# Patient Record
Sex: FEMALE | Race: BLACK OR AFRICAN AMERICAN | NOT HISPANIC OR LATINO | Employment: FULL TIME | URBAN - NONMETROPOLITAN AREA
[De-identification: names, ages, dates, MRNs, and addresses within clinical notes are randomized per-mention and may not be internally consistent; named-entity substitution may affect disease eponyms.]

---

## 2018-04-19 ENCOUNTER — APPOINTMENT (OUTPATIENT)
Dept: ULTRASOUND IMAGING | Age: 43
End: 2018-04-19
Attending: EMERGENCY MEDICINE

## 2018-04-19 ENCOUNTER — HOSPITAL ENCOUNTER (EMERGENCY)
Age: 43
Discharge: HOME | End: 2018-04-19
Attending: EMERGENCY MEDICINE

## 2018-04-19 VITALS
DIASTOLIC BLOOD PRESSURE: 104 MMHG | RESPIRATION RATE: 18 BRPM | BODY MASS INDEX: 37.21 KG/M2 | TEMPERATURE: 98.9 F | OXYGEN SATURATION: 100 % | HEIGHT: 63 IN | WEIGHT: 210 LBS | HEART RATE: 94 BPM | SYSTOLIC BLOOD PRESSURE: 167 MMHG

## 2018-04-19 DIAGNOSIS — R10.2 PELVIC PAIN IN FEMALE: Primary | ICD-10-CM

## 2018-04-19 DIAGNOSIS — D25.1 INTRAMURAL LEIOMYOMA OF UTERUS: ICD-10-CM

## 2018-04-19 LAB
ANION GAP BLD CALC-SCNC: 15 MMOL/L
APPEARANCE UR: CLEAR
B-HCG UR QL: NEGATIVE
BASOPHILS # BLD AUTO: 0 K/MCL (ref 0–0.3)
BASOPHILS NFR BLD AUTO: 0 %
BUN BLD-MCNC: 4 MG/DL (ref 6–20)
CA-I BLD ISE-SCNC: 1.13 MMOL/L (ref 1.15–1.29)
CHLORIDE BLD-SCNC: 106 MMOL/L (ref 98–107)
CO2 BLD-SCNC: 25 MMOL/L (ref 19–24)
COLOR UR: YELLOW
CREAT BLD-MCNC: 0.5 MG/DL (ref 0.51–0.95)
CREAT BLD-MCNC: >90 MG/DL
DIFFERENTIAL METHOD BLD: NORMAL
EOSINOPHIL # BLD AUTO: 0.1 K/MCL (ref 0.1–0.5)
EOSINOPHIL NFR SPEC: 2 %
ERYTHROCYTE [DISTWIDTH] IN BLOOD: 12.5 % (ref 11–15)
GLUCOSE BLD-MCNC: 100 MG/DL (ref 65–99)
GLUCOSE UR STRIP-MCNC: NEGATIVE MG/DL
HCT VFR BLD CALC: 38 % (ref 36–46.5)
HCT VFR BLD CALC: 40 % (ref 36–46.5)
HGB BLD-MCNC: 12.6 G/DL (ref 12–15.5)
HGB BLD-MCNC: 13.6 G/DL (ref 12–15.5)
HGB UR QL STRIP: ABNORMAL
KETONES UR STRIP-MCNC: NEGATIVE MG/DL
LEUKOCYTE ESTERASE UR QL STRIP: NEGATIVE
LYMPHOCYTES # BLD MANUAL: 2.1 K/MCL (ref 1–4.8)
LYMPHOCYTES NFR BLD MANUAL: 42 %
MCH RBC QN AUTO: 28.9 PG (ref 26–34)
MCHC RBC AUTO-ENTMCNC: 33.2 G/DL (ref 32–36.5)
MCV RBC AUTO: 87.2 FL (ref 78–100)
MONOCYTES # BLD MANUAL: 0.4 K/MCL (ref 0.3–0.9)
MONOCYTES NFR BLD MANUAL: 8 %
NEUTROPHILS # BLD: 2.5 K/MCL (ref 1.8–7.7)
NEUTROPHILS NFR BLD AUTO: 48 %
NITRITE UR QL STRIP: NEGATIVE
PH UR STRIP: 6 UNITS (ref 5–7)
PLATELET # BLD: 285 K/MCL (ref 140–450)
POTASSIUM BLD-SCNC: 3.3 MMOL/L (ref 3.4–5.1)
PROT UR STRIP-MCNC: NEGATIVE MG/DL
RBC # BLD: 4.36 MIL/MCL (ref 4–5.2)
SODIUM BLD-SCNC: 141 MMOL/L (ref 135–145)
SP GR UR STRIP: 1.02 (ref 1–1.03)
WBC # BLD: 5.1 K/MCL (ref 4.2–11)

## 2018-04-19 PROCEDURE — 10002807 HB RX 258: Performed by: EMERGENCY MEDICINE

## 2018-04-19 PROCEDURE — 81025 URINE PREGNANCY TEST: CPT | Performed by: EMERGENCY MEDICINE

## 2018-04-19 PROCEDURE — 93975 VASCULAR STUDY: CPT

## 2018-04-19 PROCEDURE — 96374 THER/PROPH/DIAG INJ IV PUSH: CPT

## 2018-04-19 PROCEDURE — 99284 EMERGENCY DEPT VISIT MOD MDM: CPT

## 2018-04-19 PROCEDURE — 96361 HYDRATE IV INFUSION ADD-ON: CPT

## 2018-04-19 PROCEDURE — 85025 COMPLETE CBC W/AUTO DIFF WBC: CPT

## 2018-04-19 PROCEDURE — 76830 TRANSVAGINAL US NON-OB: CPT | Performed by: RADIOLOGY

## 2018-04-19 PROCEDURE — 99284 EMERGENCY DEPT VISIT MOD MDM: CPT | Performed by: EMERGENCY MEDICINE

## 2018-04-19 PROCEDURE — 76856 US EXAM PELVIC COMPLETE: CPT

## 2018-04-19 PROCEDURE — 36415 COLL VENOUS BLD VENIPUNCTURE: CPT

## 2018-04-19 PROCEDURE — 10002800 HB RX 250 W HCPCS: Performed by: EMERGENCY MEDICINE

## 2018-04-19 PROCEDURE — 96375 TX/PRO/DX INJ NEW DRUG ADDON: CPT

## 2018-04-19 PROCEDURE — 80047 BASIC METABLC PNL IONIZED CA: CPT

## 2018-04-19 PROCEDURE — 81003 URINALYSIS AUTO W/O SCOPE: CPT

## 2018-04-19 PROCEDURE — 76856 US EXAM PELVIC COMPLETE: CPT | Performed by: RADIOLOGY

## 2018-04-19 RX ORDER — ONDANSETRON 2 MG/ML
4 INJECTION INTRAMUSCULAR; INTRAVENOUS ONCE
Status: COMPLETED | OUTPATIENT
Start: 2018-04-19 | End: 2018-04-19

## 2018-04-19 RX ADMIN — ONDANSETRON 4 MG: 2 INJECTION INTRAMUSCULAR; INTRAVENOUS at 02:53

## 2018-04-19 RX ADMIN — SODIUM CHLORIDE, POTASSIUM CHLORIDE, SODIUM LACTATE AND CALCIUM CHLORIDE 1000 ML: 600; 310; 30; 20 INJECTION, SOLUTION INTRAVENOUS at 02:54

## 2018-04-19 RX ADMIN — KETOROLAC TROMETHAMINE 15 MG: 30 INJECTION, SOLUTION INTRAMUSCULAR at 02:54

## 2018-04-19 ASSESSMENT — ENCOUNTER SYMPTOMS
COLOR CHANGE: 0
EYE PAIN: 0
SORE THROAT: 0
SHORTNESS OF BREATH: 0
CONSTIPATION: 0
FEVER: 0
ABDOMINAL PAIN: 1
BACK PAIN: 1
DIZZINESS: 0
VOMITING: 0
COUGH: 0
WEAKNESS: 0
BLOOD IN STOOL: 0
NERVOUS/ANXIOUS: 0
LIGHT-HEADEDNESS: 0
CHILLS: 0
DIARRHEA: 0
NAUSEA: 1
CONFUSION: 0

## 2018-04-19 ASSESSMENT — PAIN SCALES - GENERAL
PAINLEVEL_OUTOF10: 10
PAINLEVEL_OUTOF10: 5

## 2018-07-02 ENCOUNTER — HOSPITAL ENCOUNTER (EMERGENCY)
Age: 43
Discharge: HOME | End: 2018-07-02
Attending: EMERGENCY MEDICINE | Admitting: EMERGENCY MEDICINE
Payer: COMMERCIAL

## 2018-07-02 VITALS
TEMPERATURE: 98.4 F | WEIGHT: 220 LBS | DIASTOLIC BLOOD PRESSURE: 105 MMHG | RESPIRATION RATE: 16 BRPM | HEIGHT: 63 IN | HEART RATE: 73 BPM | BODY MASS INDEX: 38.98 KG/M2 | SYSTOLIC BLOOD PRESSURE: 170 MMHG | OXYGEN SATURATION: 100 %

## 2018-07-02 DIAGNOSIS — N93.9 VAGINAL BLEEDING: ICD-10-CM

## 2018-07-02 DIAGNOSIS — R03.0 ELEVATED BLOOD PRESSURE READING: ICD-10-CM

## 2018-07-02 DIAGNOSIS — Z86.018 HISTORY OF UTERINE FIBROID: ICD-10-CM

## 2018-07-02 DIAGNOSIS — R10.2 PELVIC PAIN: Primary | ICD-10-CM

## 2018-07-02 LAB
ALBUMIN SERPL-MCNC: 3.7 G/DL (ref 3.4–5)
ALBUMIN/GLOB SERPL: 0.9 {RATIO} (ref 0.8–1.7)
ALP SERPL-CCNC: 75 U/L (ref 45–117)
ALT SERPL-CCNC: 20 U/L (ref 13–56)
ANION GAP SERPL CALC-SCNC: 8 MMOL/L (ref 3–18)
APPEARANCE UR: CLEAR
AST SERPL-CCNC: 22 U/L (ref 15–37)
BACTERIA URNS QL MICRO: ABNORMAL /HPF
BASOPHILS # BLD: 0 K/UL (ref 0–0.06)
BASOPHILS NFR BLD: 0 % (ref 0–2)
BILIRUB SERPL-MCNC: 0.3 MG/DL (ref 0.2–1)
BILIRUB UR QL: NEGATIVE
BUN SERPL-MCNC: 6 MG/DL (ref 7–18)
BUN/CREAT SERPL: 8 (ref 12–20)
CALCIUM SERPL-MCNC: 8.3 MG/DL (ref 8.5–10.1)
CHLORIDE SERPL-SCNC: 106 MMOL/L (ref 100–108)
CO2 SERPL-SCNC: 26 MMOL/L (ref 21–32)
COLOR UR: YELLOW
CREAT SERPL-MCNC: 0.75 MG/DL (ref 0.6–1.3)
DIFFERENTIAL METHOD BLD: NORMAL
EOSINOPHIL # BLD: 0.1 K/UL (ref 0–0.4)
EOSINOPHIL NFR BLD: 1 % (ref 0–5)
EPITH CASTS URNS QL MICRO: ABNORMAL /LPF (ref 0–5)
ERYTHROCYTE [DISTWIDTH] IN BLOOD BY AUTOMATED COUNT: 12.1 % (ref 11.6–14.5)
GLOBULIN SER CALC-MCNC: 4 G/DL (ref 2–4)
GLUCOSE SERPL-MCNC: 92 MG/DL (ref 74–99)
GLUCOSE UR STRIP.AUTO-MCNC: NEGATIVE MG/DL
HCG UR QL: NEGATIVE
HCT VFR BLD AUTO: 38.5 % (ref 35–45)
HGB BLD-MCNC: 13.3 G/DL (ref 12–16)
HGB UR QL STRIP: ABNORMAL
KETONES UR QL STRIP.AUTO: NEGATIVE MG/DL
LEUKOCYTE ESTERASE UR QL STRIP.AUTO: NEGATIVE
LYMPHOCYTES # BLD: 1.7 K/UL (ref 0.9–3.6)
LYMPHOCYTES NFR BLD: 34 % (ref 21–52)
MCH RBC QN AUTO: 29.6 PG (ref 24–34)
MCHC RBC AUTO-ENTMCNC: 34.5 G/DL (ref 31–37)
MCV RBC AUTO: 85.6 FL (ref 74–97)
MONOCYTES # BLD: 0.3 K/UL (ref 0.05–1.2)
MONOCYTES NFR BLD: 6 % (ref 3–10)
NEUTS SEG # BLD: 2.9 K/UL (ref 1.8–8)
NEUTS SEG NFR BLD: 59 % (ref 40–73)
NITRITE UR QL STRIP.AUTO: NEGATIVE
PH UR STRIP: 5 [PH] (ref 5–8)
PLATELET # BLD AUTO: 302 K/UL (ref 135–420)
PMV BLD AUTO: 9.6 FL (ref 9.2–11.8)
POTASSIUM SERPL-SCNC: 3.6 MMOL/L (ref 3.5–5.5)
PROT SERPL-MCNC: 7.7 G/DL (ref 6.4–8.2)
PROT UR STRIP-MCNC: ABNORMAL MG/DL
RBC # BLD AUTO: 4.5 M/UL (ref 4.2–5.3)
RBC #/AREA URNS HPF: ABNORMAL /HPF (ref 0–5)
SODIUM SERPL-SCNC: 140 MMOL/L (ref 136–145)
SP GR UR REFRACTOMETRY: >1.03 (ref 1–1.03)
UROBILINOGEN UR QL STRIP.AUTO: 1 EU/DL (ref 0.2–1)
WBC # BLD AUTO: 4.9 K/UL (ref 4.6–13.2)
WBC URNS QL MICRO: ABNORMAL /HPF (ref 0–4)

## 2018-07-02 PROCEDURE — 74011250636 HC RX REV CODE- 250/636: Performed by: EMERGENCY MEDICINE

## 2018-07-02 PROCEDURE — 81025 URINE PREGNANCY TEST: CPT | Performed by: NURSE PRACTITIONER

## 2018-07-02 PROCEDURE — 81001 URINALYSIS AUTO W/SCOPE: CPT | Performed by: NURSE PRACTITIONER

## 2018-07-02 PROCEDURE — 85025 COMPLETE CBC W/AUTO DIFF WBC: CPT | Performed by: NURSE PRACTITIONER

## 2018-07-02 PROCEDURE — 80053 COMPREHEN METABOLIC PANEL: CPT | Performed by: NURSE PRACTITIONER

## 2018-07-02 PROCEDURE — 96374 THER/PROPH/DIAG INJ IV PUSH: CPT

## 2018-07-02 PROCEDURE — 96361 HYDRATE IV INFUSION ADD-ON: CPT

## 2018-07-02 PROCEDURE — 99284 EMERGENCY DEPT VISIT MOD MDM: CPT

## 2018-07-02 RX ORDER — TRAMADOL HYDROCHLORIDE 50 MG/1
50 TABLET ORAL
Qty: 12 TAB | Refills: 0 | Status: SHIPPED | OUTPATIENT
Start: 2018-07-02

## 2018-07-02 RX ORDER — KETOROLAC TROMETHAMINE 30 MG/ML
30 INJECTION, SOLUTION INTRAMUSCULAR; INTRAVENOUS
Status: COMPLETED | OUTPATIENT
Start: 2018-07-02 | End: 2018-07-02

## 2018-07-02 RX ADMIN — SODIUM CHLORIDE 500 ML: 900 INJECTION, SOLUTION INTRAVENOUS at 18:11

## 2018-07-02 RX ADMIN — KETOROLAC TROMETHAMINE 30 MG: 30 INJECTION, SOLUTION INTRAMUSCULAR at 18:11

## 2018-07-02 NOTE — LETTER
NOTIFICATION RETURN TO WORK / SCHOOL 
 
7/3/2018 11:42 AM 
 
Ms. Abraham Mittal [address] [place] [address] To Whom It May Concern: 
 
Abraham Mittal is currently under the care of [facility] EMERGENCY DEPT. She will return to work/school on: 7/4/18 If there are questions or concerns please have the patient contact our office.  
 
 
 
Sincerely, 
 
 
AMBER Hensley

## 2018-07-02 NOTE — ED NOTES
Fully awake and ambulatory in ED at time of disposition. Reports significant relief of pain with Toradol injection.

## 2018-07-02 NOTE — ED TRIAGE NOTES
LLQ pain and cramping, onset two day, worsening today, vaginal bleeding started this morning. No clots.  States last cycle on 6/17/18

## 2018-07-02 NOTE — ED PROVIDER NOTES
EMERGENCY DEPARTMENT HISTORY AND PHYSICAL EXAM    Date: 7/2/2018  Patient Name: Anabella Miller    History of Presenting Illness     Chief Complaint   Patient presents with    Abdominal Pain    Vaginal Bleeding         History Provided By: Patient    Chief Complaint: vaginal bleeding and pelvic pain  Duration: 2 Days  Timing:  Acute  Location: vaginal/pelvic  Quality: Cramping  Severity: Moderate  Modifying Factors: h/o uterine fibroids  Associated Symptoms: lightheaded      Additional History (Context): Anabella Miller is a 37 y.o. female with obesity and uterine fibroids who presents with painful vaginal bleeding since yesterday; used 4 tampons today. Is a  and has had ED visits OOT for uterine fibroids. Has US confirming one fibroid here and has appt next month w/GYN at [facility] for hysterectomy consultation. PCP: Deonna Tucker MD    Current Outpatient Prescriptions   Medication Sig Dispense Refill    traMADol (ULTRAM) 50 mg tablet Take 1 Tab by mouth every six (6) hours as needed for Pain. Max Daily Amount: 200 mg. 12 Tab 0       Past History     Past Medical History:  Past Medical History:   Diagnosis Date    Breast lump     right axilla    Endometriosis     Hypertension     not officially diagnosed, not checked out yet per patient       Past Surgical History:  Past Surgical History:   Procedure Laterality Date    ABDOMEN SURGERY PROC UNLISTED      fiboid surgery    HX GYN      ectopic    HX TUBAL LIGATION         Family History:  No family history on file.     Social History:  Social History   Substance Use Topics    Smoking status: Never Smoker    Smokeless tobacco: Never Used    Alcohol use No       Allergies:  No Known Allergies      Review of Systems   Review of Systems  All Other Systems Negative  Physical Exam     Vitals:    07/02/18 1507   BP: (!) 170/105   Pulse: 73   Resp: 16   Temp: 98.4 °F (36.9 °C)   SpO2: 100%   Weight: 99.8 kg (220 lb)   Height: 5' 3\" (1.6 m)     Physical Exam Diagnostic Study Results     Labs -     Recent Results (from the past 12 hour(s))   URINALYSIS W/ RFLX MICROSCOPIC    Collection Time: 07/02/18  3:45 PM   Result Value Ref Range    Color YELLOW      Appearance CLEAR      Specific gravity >1.030 (H) 1.005 - 1.030    pH (UA) 5.0 5.0 - 8.0      Protein TRACE (A) NEG mg/dL    Glucose NEGATIVE  NEG mg/dL    Ketone NEGATIVE  NEG mg/dL    Bilirubin NEGATIVE  NEG      Blood LARGE (A) NEG      Urobilinogen 1.0 0.2 - 1.0 EU/dL    Nitrites NEGATIVE  NEG      Leukocyte Esterase NEGATIVE  NEG     HCG URINE, QL    Collection Time: 07/02/18  3:45 PM   Result Value Ref Range    HCG urine, QL NEGATIVE  NEG     URINE MICROSCOPIC ONLY    Collection Time: 07/02/18  3:45 PM   Result Value Ref Range    WBC 2 to 3 0 - 4 /hpf    RBC TOO NUMEROUS TO COUNT 0 - 5 /hpf    Epithelial cells FEW 0 - 5 /lpf    Bacteria 1+ (A) NEG /hpf   CBC WITH AUTOMATED DIFF    Collection Time: 07/02/18  3:50 PM   Result Value Ref Range    WBC 4.9 4.6 - 13.2 K/uL    RBC 4.50 4. 20 - 5.30 M/uL    HGB 13.3 12.0 - 16.0 g/dL    HCT 38.5 35.0 - 45.0 %    MCV 85.6 74.0 - 97.0 FL    MCH 29.6 24.0 - 34.0 PG    MCHC 34.5 31.0 - 37.0 g/dL    RDW 12.1 11.6 - 14.5 %    PLATELET 835 381 - 824 K/uL    MPV 9.6 9.2 - 11.8 FL    NEUTROPHILS 59 40 - 73 %    LYMPHOCYTES 34 21 - 52 %    MONOCYTES 6 3 - 10 %    EOSINOPHILS 1 0 - 5 %    BASOPHILS 0 0 - 2 %    ABS. NEUTROPHILS 2.9 1.8 - 8.0 K/UL    ABS. LYMPHOCYTES 1.7 0.9 - 3.6 K/UL    ABS. MONOCYTES 0.3 0.05 - 1.2 K/UL    ABS. EOSINOPHILS 0.1 0.0 - 0.4 K/UL    ABS.  BASOPHILS 0.0 0.0 - 0.06 K/UL    DF AUTOMATED     METABOLIC PANEL, COMPREHENSIVE    Collection Time: 07/02/18  3:50 PM   Result Value Ref Range    Sodium 140 136 - 145 mmol/L    Potassium 3.6 3.5 - 5.5 mmol/L    Chloride 106 100 - 108 mmol/L    CO2 26 21 - 32 mmol/L    Anion gap 8 3.0 - 18 mmol/L    Glucose 92 74 - 99 mg/dL    BUN 6 (L) 7.0 - 18 MG/DL    Creatinine 0.75 0.6 - 1.3 MG/DL    BUN/Creatinine ratio 8 (L) 12 - 20      GFR est AA >60 >60 ml/min/1.73m2    GFR est non-AA >60 >60 ml/min/1.73m2    Calcium 8.3 (L) 8.5 - 10.1 MG/DL    Bilirubin, total 0.3 0.2 - 1.0 MG/DL    ALT (SGPT) 20 13 - 56 U/L    AST (SGOT) 22 15 - 37 U/L    Alk. phosphatase 75 45 - 117 U/L    Protein, total 7.7 6.4 - 8.2 g/dL    Albumin 3.7 3.4 - 5.0 g/dL    Globulin 4.0 2.0 - 4.0 g/dL    A-G Ratio 0.9 0.8 - 1.7         Radiologic Studies -   No orders to display     CT Results  (Last 48 hours)    None        CXR Results  (Last 48 hours)    None            Medical Decision Making   I am the first provider for this patient. I reviewed the vital signs, available nursing notes, past medical history, past surgical history, family history and social history. Vital Signs-Reviewed the patient's vital signs. Records Reviewed: Nursing Notes    Procedures:  Pelvic Exam  Date/Time: 7/2/2018 8:31 PM  Performed by: PA  Procedure duration:  3 minutes. Type of exam performed: speculum and bimanual.    External genitalia appearance: normal.    Vaginal exam:  bleeding. Bleeding: moderate  Cervical exam:  os closed and active bleeding from os. Specimen(s) collected:  none. Bimanual exam:  uterine tenderness. Patient tolerance: Patient tolerated the procedure well with no immediate complications          Provider Notes (Medical Decision Making): uterine fibroid; firm on exam, not generous active bleeding now. Stable H/H and VS.  Hydrate for comfort. Give GYN f/up and pain meds. Discussed w/ED attending forgoing US and agrees with plan for f/up as outpatient. MED RECONCILIATION:  No current facility-administered medications for this encounter. Current Outpatient Prescriptions   Medication Sig    traMADol (ULTRAM) 50 mg tablet Take 1 Tab by mouth every six (6) hours as needed for Pain. Max Daily Amount: 200 mg. Disposition:  home    DISCHARGE NOTE:   5:35 PM    Pt has been reexamined.   Patient has no new complaints, changes, or physical findings. Care plan outlined and precautions discussed. Results of labs were reviewed with the patient. All medications were reviewed with the patient; will d/c home with ultram. All of pt's questions and concerns were addressed. Patient was instructed and agrees to follow up with GYN, as well as to return to the ED upon further deterioration. Patient is ready to go home. Follow-up Information     Follow up With Details Comments [address], [place] Schedule an appointment as soon as possible for a visit in 1 day  [facility] Marrero [facility] EMERGENCY DEPT  If symptoms worsen return immediately [address] [place] [address] 51    Callum Galvan MD Schedule an appointment as soon as possible for a visit in 1 day for your blood pressure follow up [address] [address]  [phone]            Discharge Medication List as of 7/2/2018  5:24 PM      START taking these medications    Details   traMADol (ULTRAM) 50 mg tablet Take 1 Tab by mouth every six (6) hours as needed for Pain. Max Daily Amount: 200 mg., Print, Disp-12 Tab, R-0               Diagnosis     Clinical Impression:   1. Pelvic pain    2. Vaginal bleeding    3. History of uterine fibroid    4.  Elevated blood pressure reading

## 2018-07-02 NOTE — DISCHARGE INSTRUCTIONS
Elevated Blood Pressure: Care Instructions  Your Care Instructions    Blood pressure is a measure of how hard the blood pushes against the walls of your arteries. It's normal for blood pressure to go up and down throughout the day. But if it stays up over time, you have high blood pressure. Two numbers tell you your blood pressure. The first number is the systolic pressure. It shows how hard the blood pushes when your heart is pumping. The second number is the diastolic pressure. It shows how hard the blood pushes between heartbeats, when your heart is relaxed and filling with blood. An ideal blood pressure in adults is less than 120/80 (say \"120 over 80\"). High blood pressure is 140/90 or higher. You have high blood pressure if your top number is 140 or higher or your bottom number is 90 or higher, or both. The main test for high blood pressure is simple, fast, and painless. To diagnose high blood pressure, your doctor will test your blood pressure at different times. After testing your blood pressure, your doctor may ask you to test it again when you are home. If you are diagnosed with high blood pressure, you can work with your doctor to make a long-term plan to manage it. Follow-up care is a key part of your treatment and safety. Be sure to make and go to all appointments, and call your doctor if you are having problems. It's also a good idea to know your test results and keep a list of the medicines you take. How can you care for yourself at home? · Do not smoke. Smoking increases your risk for heart attack and stroke. If you need help quitting, talk to your doctor about stop-smoking programs and medicines. These can increase your chances of quitting for good. · Stay at a healthy weight. · Try to limit how much sodium you eat to less than 2,300 milligrams (mg) a day. Your doctor may ask you to try to eat less than 1,500 mg a day. · Be physically active.  Get at least 30 minutes of exercise on most days of the week. Walking is a good choice. You also may want to do other activities, such as running, swimming, cycling, or playing tennis or team sports. · Avoid or limit alcohol. Talk to your doctor about whether you can drink any alcohol. · Eat plenty of fruits, vegetables, and low-fat dairy products. Eat less saturated and total fats. · Learn how to check your blood pressure at home. When should you call for help? Call your doctor now or seek immediate medical care if:  ? · Your blood pressure is much higher than normal (such as 180/110 or higher). ? · You think high blood pressure is causing symptoms such as:  ¨ Severe headache. ¨ Blurry vision. ? Watch closely for changes in your health, and be sure to contact your doctor if:  ? · You do not get better as expected. Where can you learn more? Go to http://unique-clemencia.info/. Enter Q670 in the search box to learn more about \"Elevated Blood Pressure: Care Instructions. \"  Current as of: September 21, 2016  Content Version: 11.4  © 4511-4671 Healthwise, Incorporated. Care instructions adapted under license by [employer] (which disclaims liability or warranty for this information). If you have questions about a medical condition or this instruction, always ask your healthcare professional. [address] any warranty or liability for your use of this information. I have reviewed discharge instructions with the patient. The patient verbalized understanding.

## 2022-04-04 ENCOUNTER — HOSPITAL ENCOUNTER (OUTPATIENT)
Dept: LAB | Age: 47
Discharge: HOME | End: 2022-04-04
Payer: COMMERCIAL

## 2022-04-04 PROCEDURE — 88175 CYTOPATH C/V AUTO FLUID REDO: CPT

## 2022-04-04 PROCEDURE — 88305 TISSUE EXAM BY PATHOLOGIST: CPT

## 2022-04-04 PROCEDURE — 87624 HPV HI-RISK TYP POOLED RSLT: CPT

## 2022-10-17 ENCOUNTER — HOSPITAL ENCOUNTER (OUTPATIENT)
Dept: LAB | Age: 47
Discharge: HOME | End: 2022-10-17
Payer: COMMERCIAL

## 2022-10-17 LAB
ANION GAP SERPL CALC-SCNC: 5 MMOL/L (ref 3–18)
BUN SERPL-MCNC: 11 MG/DL (ref 7–18)
BUN/CREAT SERPL: 22 (ref 12–20)
CALCIUM SERPL-MCNC: 8.8 MG/DL (ref 8.5–10.1)
CHLORIDE SERPL-SCNC: 106 MMOL/L (ref 100–111)
CO2 SERPL-SCNC: 28 MMOL/L (ref 21–32)
CREAT SERPL-MCNC: 0.51 MG/DL (ref 0.6–1.3)
GLUCOSE SERPL-MCNC: 102 MG/DL (ref 74–99)
POTASSIUM SERPL-SCNC: 4.1 MMOL/L (ref 3.5–5.5)
SODIUM SERPL-SCNC: 139 MMOL/L (ref 136–145)

## 2022-10-17 PROCEDURE — 36415 COLL VENOUS BLD VENIPUNCTURE: CPT

## 2022-10-17 PROCEDURE — 80048 BASIC METABOLIC PNL TOTAL CA: CPT

## 2024-02-19 SDOH — HEALTH STABILITY: PHYSICAL HEALTH: ON AVERAGE, HOW MANY DAYS PER WEEK DO YOU ENGAGE IN MODERATE TO STRENUOUS EXERCISE (LIKE A BRISK WALK)?: 6 DAYS

## 2024-02-19 SDOH — HEALTH STABILITY: PHYSICAL HEALTH: ON AVERAGE, HOW MANY MINUTES DO YOU ENGAGE IN EXERCISE AT THIS LEVEL?: 10 MIN

## 2024-02-28 SDOH — HEALTH STABILITY: PHYSICAL HEALTH: ON AVERAGE, HOW MANY DAYS PER WEEK DO YOU ENGAGE IN MODERATE TO STRENUOUS EXERCISE (LIKE A BRISK WALK)?: 6 DAYS

## 2024-02-28 SDOH — HEALTH STABILITY: PHYSICAL HEALTH: ON AVERAGE, HOW MANY MINUTES DO YOU ENGAGE IN EXERCISE AT THIS LEVEL?: 10 MIN

## 2024-03-01 ENCOUNTER — OFFICE VISIT (OUTPATIENT)
Age: 49
End: 2024-03-01

## 2024-03-01 VITALS — BODY MASS INDEX: 44.12 KG/M2 | HEIGHT: 63 IN | WEIGHT: 249 LBS | TEMPERATURE: 97.8 F

## 2024-03-01 DIAGNOSIS — M25.462 EFFUSION OF LEFT KNEE: ICD-10-CM

## 2024-03-01 DIAGNOSIS — M17.12 ARTHRITIS OF LEFT KNEE: ICD-10-CM

## 2024-03-01 DIAGNOSIS — M25.80: ICD-10-CM

## 2024-03-01 DIAGNOSIS — M23.8X2 KNEE CREPITUS, LEFT: ICD-10-CM

## 2024-03-01 DIAGNOSIS — M25.562 LEFT KNEE PAIN, UNSPECIFIED CHRONICITY: Primary | ICD-10-CM

## 2024-03-01 DIAGNOSIS — M25.662 DECREASED RANGE OF MOTION (ROM) OF LEFT KNEE: ICD-10-CM

## 2024-03-01 DIAGNOSIS — E66.01 OBESITY, MORBID, BMI 40.0-49.9 (HCC): ICD-10-CM

## 2024-03-01 RX ORDER — HYDROCODONE BITARTRATE AND ACETAMINOPHEN 5; 325 MG/1; MG/1
TABLET ORAL
COMMUNITY
Start: 2022-04-27

## 2024-03-01 RX ORDER — MEDROXYPROGESTERONE ACETATE 10 MG/1
TABLET ORAL
COMMUNITY

## 2024-03-01 RX ORDER — LOSARTAN POTASSIUM AND HYDROCHLOROTHIAZIDE 12.5; 5 MG/1; MG/1
1 TABLET ORAL DAILY
COMMUNITY

## 2024-03-01 RX ORDER — DICYCLOMINE HCL 20 MG
TABLET ORAL
COMMUNITY

## 2024-03-01 RX ORDER — TRIAMCINOLONE ACETONIDE 40 MG/ML
80 INJECTION, SUSPENSION INTRA-ARTICULAR; INTRAMUSCULAR ONCE
Status: COMPLETED | OUTPATIENT
Start: 2024-03-01 | End: 2024-03-01

## 2024-03-01 RX ORDER — BUPIVACAINE HYDROCHLORIDE 5 MG/ML
7 INJECTION, SOLUTION PERINEURAL ONCE
Status: COMPLETED | OUTPATIENT
Start: 2024-03-01 | End: 2024-03-01

## 2024-03-01 RX ORDER — IBUPROFEN 800 MG/1
TABLET ORAL
COMMUNITY
Start: 2022-04-27

## 2024-03-01 RX ORDER — FAMOTIDINE 40 MG/1
TABLET, FILM COATED ORAL
COMMUNITY
Start: 2022-04-27

## 2024-03-01 RX ADMIN — BUPIVACAINE HYDROCHLORIDE 35 MG: 5 INJECTION, SOLUTION PERINEURAL at 10:12

## 2024-03-01 RX ADMIN — TRIAMCINOLONE ACETONIDE 80 MG: 40 INJECTION, SUSPENSION INTRA-ARTICULAR; INTRAMUSCULAR at 10:12

## 2024-03-01 NOTE — PROGRESS NOTES
Normal symmetry, bulk and tone of facial and neck musculature. Trachea midline.No discernible thyromegaly or masses. No involuntary movements.   Lymphatic: No preauricular, submandibuar, anterior or posterior cervical lymphadenopathy.   Psychiatric: The patient is awake, alert, and oriented to person, place and time. Behavior is normal. Thought content normal.   Cardiovascular: No clubbing, cyanosis.  No edema bilateral lower extremities.   Pulmonary: No tripoding nor accessory muscle recruitment. Breathing normally, no distress, no audible wheezing.     Distal cap refill intact at 2/2 Arie UE / LE.  Neuro intact Arie UE/LE to noxious stimuli      Ortho Specific exam:  Left knee skin intact no warmth, erythema, edema, or ecchymosis.  1+ effusion noted.    MCL LCL ACL and PCL intact no laxity pain or giveaway    Patella tracks midline with crepitation Quad and patellar tendons intact no palpable deformities or concavities noted.    Active range of motion guarded at bedside while supine 95 degrees -5 degrees with a valgus deformity extension plane.  Crepitus is noted throughout.    Soft tissue impingement noted proximal posterior calf.    Popliteal fullness is noted.    No evidence of DVT or calf tenderness left lower extremity.      IMAGING:  Imaging read by myself and interpreted as follows:  X-ray: [facility] 3/1/2024 space 3 view of the left knee reveals tricompartmental osteoarthritis with near complete collapse of the medial joint space and patella patellofemoral.  Moderate narrowing of the lateral joint space.  No lytic or blastic lesions.  No soft tissue ossifications.  No fracture deformities.    IMPRESSION:      ICD-10-CM    1. Left knee pain, unspecified chronicity  M25.562 [06662] Knee 3V     BUPivacaine (MARCAINE) 0.5 % injection 35 mg     triamcinolone acetonide (KENALOG-40) injection 80 mg     DRAIN/INJECT LARGE JOINT/BURSA      2. Arthritis of left knee  M17.12       3. Joint impingement affecting

## 2024-03-14 ENCOUNTER — OFFICE VISIT (OUTPATIENT)
Age: 49
End: 2024-03-14
Payer: COMMERCIAL

## 2024-03-14 VITALS — HEIGHT: 63 IN | WEIGHT: 246 LBS | BODY MASS INDEX: 43.59 KG/M2

## 2024-03-14 DIAGNOSIS — M25.562 LEFT KNEE PAIN, UNSPECIFIED CHRONICITY: Primary | ICD-10-CM

## 2024-03-14 DIAGNOSIS — M47.26 OSTEOARTHRITIS OF SPINE WITH RADICULOPATHY, LUMBAR REGION: ICD-10-CM

## 2024-03-14 DIAGNOSIS — E66.01 OBESITY, MORBID, BMI 40.0-49.9 (HCC): ICD-10-CM

## 2024-03-14 DIAGNOSIS — M25.69 DECREASED RANGE OF MOTION OF TRUNK AND BACK: ICD-10-CM

## 2024-03-14 DIAGNOSIS — M54.59 MECHANICAL LOW BACK PAIN: ICD-10-CM

## 2024-03-14 DIAGNOSIS — M54.50 LUMBAR PAIN: ICD-10-CM

## 2024-03-14 PROCEDURE — G8428 CUR MEDS NOT DOCUMENT: HCPCS | Performed by: PHYSICIAN ASSISTANT

## 2024-03-14 PROCEDURE — 1036F TOBACCO NON-USER: CPT | Performed by: PHYSICIAN ASSISTANT

## 2024-03-14 PROCEDURE — 72100 X-RAY EXAM L-S SPINE 2/3 VWS: CPT | Performed by: PHYSICIAN ASSISTANT

## 2024-03-14 PROCEDURE — G8484 FLU IMMUNIZE NO ADMIN: HCPCS | Performed by: PHYSICIAN ASSISTANT

## 2024-03-14 PROCEDURE — G8417 CALC BMI ABV UP PARAM F/U: HCPCS | Performed by: PHYSICIAN ASSISTANT

## 2024-03-14 PROCEDURE — 3017F COLORECTAL CA SCREEN DOC REV: CPT | Performed by: PHYSICIAN ASSISTANT

## 2024-03-14 PROCEDURE — 99213 OFFICE O/P EST LOW 20 MIN: CPT | Performed by: PHYSICIAN ASSISTANT

## 2024-03-14 RX ORDER — CYCLOBENZAPRINE HCL 10 MG
10 TABLET ORAL 3 TIMES DAILY PRN
Qty: 30 TABLET | Refills: 0 | Status: SHIPPED | OUTPATIENT
Start: 2024-03-14 | End: 2024-03-24

## 2024-03-14 NOTE — PROGRESS NOTES
decrease carbohydrate and sugar intake.  Patient recommended a formal dietary consult which they will consider and return a call to our office.  In light of the patient's osteoarthritic findings I am making a recommendation for aerobic exercise to include but not limited to stationary bicycle, elliptical, therapeutic walking with good shoes and or swimming.  Patient should avoid any running or jumping.  If using the treadmill then recommendation for no elevation and no running or jogging.       Appropriate for outpatient management. Will discuss supportive treatment, NSAIDS, RICE and orthopedic follow-up. Discussed treatment plan, return precautions, symptomatic relief, and expected time to improvement. All questions answered. Patient is stable for discharge and outpatient management. Medication use, risk/benefit, side effects, and precautions discussed.        Care plan outlined and precautions discussed.  Results were reviewed with the patient. All medications were reviewed with the patient. All of pt's questions and concerns were addressed.  Alarm symptoms and return precautions associated with chief complaint and evaluation were reviewed with the patient in detail.  The patient demonstrated adequate understanding.The patient expresses willing compliance with the treatment plan.     Special note: Medication management discussed in detail all patient's questions answered to their satisfaction.        Patient provided a reminder for a \"due or due soon\" health maintenance. I have asked the patient to schedule an appointment with their primary care provider for follow-up on general health maintenance concerns.    Today all the patient's questions were answered to their satisfaction.  Copies of x-rays reviewed if obtained this visit, and provided to patient.        Dictation disclaimer:  Please note that this dictation was completed with \"Dragon\", the computer voice recognition software. Today's exam was dictated 
using fluency dictation software (voice recognition software). Occasionally, sound-a-like computer induced   dictation errors will populate the report.  Quite often unanticipated grammatical, syntax, homophones, and other interpretive errors are inadvertently transcribed by the computer software.  Please disregard these errors.  Please excuse any errors that have escaped final proofreading.        Nghia NELSON, APC, MPAS, PA-C  [facility]  [facility]

## 2024-04-03 ENCOUNTER — TRANSCRIBE ORDERS (OUTPATIENT)
Facility: HOSPITAL | Age: 49
End: 2024-04-03

## 2024-04-03 DIAGNOSIS — M54.59 MECHANICAL LOW BACK PAIN: ICD-10-CM

## 2024-04-03 DIAGNOSIS — M54.50 LUMBAR PAIN: ICD-10-CM

## 2024-04-03 DIAGNOSIS — M47.26 OSTEOARTHRITIS OF SPINE WITH RADICULOPATHY, LUMBAR REGION: Primary | ICD-10-CM

## 2024-04-03 DIAGNOSIS — M25.69 DECREASED RANGE OF MOTION OF TRUNK AND BACK: ICD-10-CM

## 2024-04-12 ENCOUNTER — OFFICE VISIT (OUTPATIENT)
Age: 49
End: 2024-04-12

## 2024-04-12 VITALS — HEIGHT: 63 IN | BODY MASS INDEX: 43.41 KG/M2 | WEIGHT: 245 LBS

## 2024-04-12 DIAGNOSIS — M65.311 TRIGGER THUMB OF RIGHT HAND: Primary | ICD-10-CM

## 2024-04-12 RX ORDER — LIDOCAINE HYDROCHLORIDE 10 MG/ML
0.5 INJECTION, SOLUTION INFILTRATION; PERINEURAL ONCE
Status: COMPLETED | OUTPATIENT
Start: 2024-04-12 | End: 2024-04-12

## 2024-04-12 RX ADMIN — LIDOCAINE HYDROCHLORIDE 0.5 ML: 10 INJECTION, SOLUTION INFILTRATION; PERINEURAL at 11:37

## 2024-04-12 NOTE — PROGRESS NOTES
Esme Gonzales is a 48 y.o. female right handed .  Worker's Compensation and legal considerations: none    Chief Complaint   Patient presents with    Hand Pain     Thumb rt      Pain Score:   5    HPI: Patient presents today with complaints of right thumb pain and locking.  She localizes the pain to near the MCP joint.    Date of onset: Indeterminate  Injury: No  Prior Treatment:  No    ROS: Review of Systems - General ROS: negative except HPI    Past Medical History:   Diagnosis Date    Breast lump     right axilla    Endometriosis     Hypertension     not officially diagnosed, not checked out yet per patient       Past Surgical History:   Procedure Laterality Date    GYN      ectopic    [place] UNLISTED PROCEDURE ABDOMEN PERITONEUM & OMENTUM      fiboid surgery    TUBAL LIGATION          Current Outpatient Medications   Medication Sig Dispense Refill    diclofenac (VOLTAREN) 50 MG EC tablet Take 1 tablet by mouth 3 times daily (with meals) 60 tablet 3    dicyclomine (BENTYL) 20 MG tablet TAKE 1 TAB BY MOUTH 4 TIMES DAILY AS NEEDED (ABDOMINAL PAIN). PRN      famotidine (PEPCID) 40 MG tablet Take 1 tablet every day by oral route at bedtime.      ibuprofen (ADVIL;MOTRIN) 800 MG tablet Take 1 tablet 3 times a day by oral route as needed.      losartan-hydroCHLOROthiazide (HYZAAR) 50-12.5 MG per tablet Take 1 tablet by mouth daily      HYDROcodone-acetaminophen (NORCO) 5-325 MG per tablet Take 1 tablet every 8 hours by oral route as needed. (Patient not taking: Reported on 4/12/2024)      medroxyPROGESTERone (PROVERA) 10 MG tablet Take 1 tablet every day by oral route for 7 days. (Patient not taking: Reported on 4/12/2024)      traMADol (ULTRAM) 50 MG tablet Take 1 tablet by mouth every 6 hours as needed. (Patient not taking: Reported on 4/12/2024)       No current facility-administered medications for this visit.       Allergies   Allergen Reactions    Other          Ht 1.6 m (5' 3\")   Wt 111.1 kg (245 lb)

## 2024-04-18 ENCOUNTER — OFFICE VISIT (OUTPATIENT)
Age: 49
End: 2024-04-18
Payer: MEDICAID

## 2024-04-18 VITALS — WEIGHT: 246 LBS | BODY MASS INDEX: 43.59 KG/M2 | HEIGHT: 63 IN

## 2024-04-18 DIAGNOSIS — M17.12 ARTHRITIS OF LEFT KNEE: Primary | ICD-10-CM

## 2024-04-18 PROCEDURE — 99213 OFFICE O/P EST LOW 20 MIN: CPT | Performed by: PHYSICIAN ASSISTANT

## 2024-04-18 PROCEDURE — 20610 DRAIN/INJ JOINT/BURSA W/O US: CPT | Performed by: PHYSICIAN ASSISTANT

## 2024-04-18 NOTE — PROGRESS NOTES
Patient: Esme Gonzales                MRN: [id number]       SSN: [id number]  YOB: 1975        AGE: 48 y.o.        SEX: female      OFFICE NOTE DICTATED    PCP: Unknown, Provider, APRN - NP  04/18/24 4/18/2024: Patient returns the office for follow-up regarding her left knee.  She does have tricompartmental osteoarthritis of her left knee and today is here to begin Gelsyn viscosupplementation.        Chief Complaint   Patient presents with    Knee Pain     Left         HISTORY:    Esme Gonzales is a 48 y.o. female presents to the office with complaint of left knee pain.  Pain has been present for 6 months.  Patient is a long- with [employer].  She is off now for the next 3 months.  She notes pain when she stands and walks in the left knee without any buckling or giveaway sensation.  She also has pain in the right hand with a noted trigger thumb.  She also is today complaining of back pain.    She is actively participating in a self-guided fasting and weight loss program.    No history of trauma associated with the left knee.    Failed to redirect to the Timeline version of the [employer] SmartLink.    No results found for: \"HBA1C\", \"[id number]\"      4/18/2024     1:17 PM 4/12/2024    11:14 AM 3/14/2024     1:23 PM 3/1/2024     9:14 AM   Weight Metrics   Weight 246 lb 245 lb 246 lb 249 lb   BMI (Calculated) 43.7 kg/m2 43.5 kg/m2 43.7 kg/m2 44.2 kg/m2          Problem List Items Addressed This Visit    None  Visit Diagnoses       Arthritis of left knee    -  Primary    Relevant Medications    sodium hyaluronate (Viscosup) injection SOSY 16.8 mg (Start on 4/18/2024  1:45 PM)    Other Relevant Orders    DRAIN/INJECT LARGE JOINT/BURSA              PAST MEDICAL HISTORY:       Diagnosis Date    Breast lump     right axilla    Endometriosis     Hypertension     not officially diagnosed, not checked out yet per patient        PAST SURGICAL HISTORY:       Procedure Laterality Date

## 2024-04-25 ENCOUNTER — OFFICE VISIT (OUTPATIENT)
Age: 49
End: 2024-04-25
Payer: MEDICAID

## 2024-04-25 VITALS — WEIGHT: 246 LBS | HEIGHT: 63 IN | BODY MASS INDEX: 43.59 KG/M2

## 2024-04-25 DIAGNOSIS — M17.12 ARTHRITIS OF LEFT KNEE: Primary | ICD-10-CM

## 2024-04-25 PROCEDURE — 20610 DRAIN/INJ JOINT/BURSA W/O US: CPT | Performed by: PHYSICIAN ASSISTANT

## 2024-04-25 PROCEDURE — 90000 NO LOS: CPT | Performed by: PHYSICIAN ASSISTANT

## 2024-04-25 NOTE — PROGRESS NOTES
Patient: Esme Gonzales                MRN: [id number]       SSN: [id number]  YOB: 1975        AGE: 48 y.o.        SEX: female      OFFICE NOTE DICTATED    PCP: Unknown, Provider, APRN - NP  04/25/24 4/25/2024: Patient returns for continuation of Gelsyn for her left knee.    4/18/2024: Patient returns the office for follow-up regarding her left knee.  She does have tricompartmental osteoarthritis of her left knee and today is here to begin Gelsyn viscosupplementation.        Chief Complaint   Patient presents with    Knee Pain     Left         HISTORY:    Esme Gonzales is a 48 y.o. female presents to the office with complaint of left knee pain.  Pain has been present for 6 months.  Patient is a long- with [employer].  She is off now for the next 3 months.  She notes pain when she stands and walks in the left knee without any buckling or giveaway sensation.  She also has pain in the right hand with a noted trigger thumb.  She also is today complaining of back pain.    She is actively participating in a self-guided fasting and weight loss program.    No history of trauma associated with the left knee.    Failed to redirect to the Timeline version of the [employer] SmartLink.    No results found for: \"HBA1C\", \"[id number]\"      4/25/2024     1:31 PM 4/18/2024     1:17 PM 4/12/2024    11:14 AM 3/14/2024     1:23 PM 3/1/2024     9:14 AM   Weight Metrics   Weight 246 lb 246 lb 245 lb 246 lb 249 lb   BMI (Calculated) 43.7 kg/m2 43.7 kg/m2 43.5 kg/m2 43.7 kg/m2 44.2 kg/m2          Problem List Items Addressed This Visit    None  Visit Diagnoses       Arthritis of left knee    -  Primary    Relevant Medications    sodium hyaluronate (Viscosup) injection SOSY 16.8 mg (Start on 4/25/2024  2:00 PM)    Other Relevant Orders    DRAIN/INJECT LARGE JOINT/BURSA              PAST MEDICAL HISTORY:       Diagnosis Date    Breast lump     right axilla    Endometriosis     Hypertension     not

## 2024-05-02 ENCOUNTER — OFFICE VISIT (OUTPATIENT)
Age: 49
End: 2024-05-02
Payer: MEDICAID

## 2024-05-02 VITALS — HEIGHT: 63 IN | BODY MASS INDEX: 44.12 KG/M2 | WEIGHT: 249 LBS

## 2024-05-02 DIAGNOSIS — M17.12 ARTHRITIS OF LEFT KNEE: Primary | ICD-10-CM

## 2024-05-02 PROCEDURE — 20610 DRAIN/INJ JOINT/BURSA W/O US: CPT | Performed by: PHYSICIAN ASSISTANT

## 2024-05-02 PROCEDURE — 90000 NO LOS: CPT | Performed by: PHYSICIAN ASSISTANT

## 2024-05-02 NOTE — PROGRESS NOTES
patient in detail.  The patient demonstrated adequate understanding.The patient expresses willing compliance with the treatment plan.     Special note: Medication management discussed in detail all patient's questions answered to their satisfaction.        Patient provided a reminder for a \"due or due soon\" health maintenance. I have asked the patient to schedule an appointment with their primary care provider for follow-up on general health maintenance concerns.    Today all the patient's questions were answered to their satisfaction.  Copies of x-rays reviewed if obtained this visit, and provided to patient.        Dictation disclaimer:  Please note that this dictation was completed with \"Dragon\", the computer voice recognition software. Today's exam was dictated using fluency dictation software (voice recognition software). Occasionally, sound-a-like computer induced   dictation errors will populate the report.  Quite often unanticipated grammatical, syntax, homophones, and other interpretive errors are inadvertently transcribed by the computer software.  Please disregard these errors.  Please excuse any errors that have escaped final proofreading.        Nghia NELSON, APC, MPAS, PA-C  [facility]  [facility]

## 2024-05-08 ENCOUNTER — TELEPHONE (OUTPATIENT)
Age: 49
End: 2024-05-08

## 2024-05-08 NOTE — TELEPHONE ENCOUNTER
[facility] called and is requesting to have the patient MRI order re enter in the system.

## 2024-05-30 ENCOUNTER — HOSPITAL ENCOUNTER (OUTPATIENT)
Facility: HOSPITAL | Age: 49
Discharge: HOME | End: 2024-05-30
Payer: MEDICAID

## 2024-05-30 DIAGNOSIS — M54.59 MECHANICAL LOW BACK PAIN: ICD-10-CM

## 2024-05-30 DIAGNOSIS — M25.69 DECREASED RANGE OF MOTION OF TRUNK AND BACK: ICD-10-CM

## 2024-05-30 DIAGNOSIS — M54.50 LUMBAR PAIN: ICD-10-CM

## 2024-05-30 DIAGNOSIS — M47.26 OSTEOARTHRITIS OF SPINE WITH RADICULOPATHY, LUMBAR REGION: ICD-10-CM

## 2024-05-30 PROCEDURE — 72148 MRI LUMBAR SPINE W/O DYE: CPT

## 2024-06-06 ENCOUNTER — TELEPHONE (OUTPATIENT)
Age: 49
End: 2024-06-06

## 2024-06-06 NOTE — TELEPHONE ENCOUNTER
Patient is asking if tomorrow's appointment to review MRI can be changed to virtual instead.  Please advise patient at [phone].

## 2024-06-07 DIAGNOSIS — M54.50 LUMBAR PAIN: Primary | ICD-10-CM

## 2025-01-10 ENCOUNTER — OFFICE VISIT (OUTPATIENT)
Age: 50
End: 2025-01-10

## 2025-01-10 VITALS — HEIGHT: 64 IN | BODY MASS INDEX: 43.36 KG/M2 | TEMPERATURE: 97.3 F | WEIGHT: 254 LBS

## 2025-01-10 DIAGNOSIS — M23.8X1 KNEE CREPITUS, RIGHT: ICD-10-CM

## 2025-01-10 DIAGNOSIS — M25.461 EFFUSION, RIGHT KNEE: ICD-10-CM

## 2025-01-10 DIAGNOSIS — M25.561 RIGHT KNEE PAIN, UNSPECIFIED CHRONICITY: Primary | ICD-10-CM

## 2025-01-10 DIAGNOSIS — M25.661 DECREASED RANGE OF MOTION (ROM) OF RIGHT KNEE: ICD-10-CM

## 2025-01-10 DIAGNOSIS — M17.11 ARTHRITIS OF KNEE, RIGHT: ICD-10-CM

## 2025-01-10 RX ORDER — BUPIVACAINE HYDROCHLORIDE 5 MG/ML
17 INJECTION, SOLUTION PERINEURAL ONCE
Status: COMPLETED | OUTPATIENT
Start: 2025-01-10 | End: 2025-01-10

## 2025-01-10 RX ORDER — TRIAMCINOLONE ACETONIDE 40 MG/ML
80 INJECTION, SUSPENSION INTRA-ARTICULAR; INTRAMUSCULAR ONCE
Status: COMPLETED | OUTPATIENT
Start: 2025-01-10 | End: 2025-01-10

## 2025-01-10 RX ADMIN — BUPIVACAINE HYDROCHLORIDE 85 MG: 5 INJECTION, SOLUTION PERINEURAL at 10:36

## 2025-01-10 RX ADMIN — TRIAMCINOLONE ACETONIDE 80 MG: 40 INJECTION, SUSPENSION INTRA-ARTICULAR; INTRAMUSCULAR at 10:37

## 2025-01-10 NOTE — PROGRESS NOTES
Patient: Esme Gonzales                MRN: [id number]       SSN: [id number]  YOB: 1975        AGE: 49 y.o.        SEX: female      OFFICE NOTE DICTATED    PCP: Indiana Lynn APRN - NP  01/10/25    January 10, 2025: Patient returns to the office with complaint of bilateral knee pain acute on chronic today right greater than left.  She was treated previously as recent as May for symptoms in the left knee.  She would like treatment for the right knee today.    2024: Patient returns the office for follow-up regarding completion of Gelsyn-3 for her left knee.  She persists with left knee pain and has underlying tricompartmental osteoarthritis of.  She also was ordered an MRI of her back but that has not been performed today in the imaging order has .  A new order for MRI was placed.    2024: Patient returns for continuation of Gelsyn for her left knee.    2024: Patient returns the office for follow-up regarding her left knee.  She does have tricompartmental osteoarthritis of her left knee and today is here to begin Gelsyn viscosupplementation.        Chief Complaint   Patient presents with    Knee Pain     Right         HISTORY:    Esme Gonzales is a 49 y.o. female presents to the office with complaint of left knee pain.  Pain has been present for 6 months.  Patient is a long- with [employer].  She is off now for the next 3 months.  She notes pain when she stands and walks in the left knee without any buckling or giveaway sensation.  She also has pain in the right hand with a noted trigger thumb.  She also is today complaining of back pain.    She is actively participating in a self-guided fasting and weight loss program.    No history of trauma associated with the left knee.        No results found for: \"HBA1C\", \"[id number]\"      1/10/2025     8:44 AM 2024    10:25 AM 2024     1:31 PM 2024     1:17 PM 2024    11:14 AM

## 2025-01-17 ENCOUNTER — OFFICE VISIT (OUTPATIENT)
Age: 50
End: 2025-01-17
Payer: MEDICAID

## 2025-01-17 VITALS — HEIGHT: 64 IN | TEMPERATURE: 97.5 F | BODY MASS INDEX: 43.71 KG/M2 | WEIGHT: 256 LBS

## 2025-01-17 DIAGNOSIS — M17.12 ARTHRITIS OF LEFT KNEE: Primary | ICD-10-CM

## 2025-01-17 DIAGNOSIS — M17.11 ARTHRITIS OF KNEE, RIGHT: ICD-10-CM

## 2025-01-17 DIAGNOSIS — M25.462 EFFUSION OF LEFT KNEE: ICD-10-CM

## 2025-01-17 PROCEDURE — 99214 OFFICE O/P EST MOD 30 MIN: CPT | Performed by: PHYSICIAN ASSISTANT

## 2025-01-17 PROCEDURE — 20611 DRAIN/INJ JOINT/BURSA W/US: CPT | Performed by: PHYSICIAN ASSISTANT

## 2025-01-17 RX ORDER — BUPIVACAINE HYDROCHLORIDE 5 MG/ML
17 INJECTION, SOLUTION PERINEURAL ONCE
Status: COMPLETED | OUTPATIENT
Start: 2025-01-17 | End: 2025-01-17

## 2025-01-17 RX ORDER — TRIAMCINOLONE ACETONIDE 40 MG/ML
80 INJECTION, SUSPENSION INTRA-ARTICULAR; INTRAMUSCULAR ONCE
Status: COMPLETED | OUTPATIENT
Start: 2025-01-17 | End: 2025-01-17

## 2025-01-17 RX ADMIN — BUPIVACAINE HYDROCHLORIDE 85 MG: 5 INJECTION, SOLUTION PERINEURAL at 10:43

## 2025-01-17 RX ADMIN — TRIAMCINOLONE ACETONIDE 80 MG: 40 INJECTION, SUSPENSION INTRA-ARTICULAR; INTRAMUSCULAR at 10:44

## 2025-01-17 NOTE — PROGRESS NOTES
follow-up. Discussed treatment plan, return precautions, symptomatic relief, and expected time to improvement. All questions answered. Patient is stable for discharge and outpatient management. Medication use, risk/benefit, side effects, and precautions discussed.        Care plan outlined and precautions discussed.  Results were reviewed with the patient. All medications were reviewed with the patient. All of pt's questions and concerns were addressed.  Alarm symptoms and return precautions associated with chief complaint and evaluation were reviewed with the patient in detail.  The patient demonstrated adequate understanding.The patient expresses willing compliance with the treatment plan.     Special note: Medication management discussed in detail all patient's questions answered to their satisfaction.        Patient provided a reminder for a \"due or due soon\" health maintenance. I have asked the patient to schedule an appointment with their primary care provider for follow-up on general health maintenance concerns.    Today all the patient's questions were answered to their satisfaction.  Copies of x-rays reviewed if obtained this visit, and provided to patient.        Dictation disclaimer:  Please note that this dictation was completed with \"Dragon\", the computer voice recognition software. Today's exam was dictated using fluency dictation software (voice recognition software). Occasionally, sound-a-like computer induced   dictation errors will populate the report.  Quite often unanticipated grammatical, syntax, homophones, and other interpretive errors are inadvertently transcribed by the computer software.  Please disregard these errors.  Please excuse any errors that have escaped final proofreading.        Nghia NELSON, APC, MPAS, PA-C  [facility]  [facility]

## 2025-03-04 ENCOUNTER — TELEPHONE (OUTPATIENT)
Age: 50
End: 2025-03-04

## 2025-03-04 NOTE — TELEPHONE ENCOUNTER
Spoke with Ms. Gonzales about her CPAP use in relation to her upcoming appointment on 3-7-2025. The patient reported that she uses her CPAP unaware of the DME company. Informed the patient to bring her SD card for compliance data. Also patient stated that she would like to reschedule her appointment due to starting a new job.

## 2025-03-10 ENCOUNTER — TELEPHONE (OUTPATIENT)
Age: 50
End: 2025-03-10

## 2025-03-10 NOTE — TELEPHONE ENCOUNTER
Patient wants to know if [facility] will prescribe loop diuretics to help with the swelling in both of her knees until her 3/17/2025 appt for Euflexxa injections    Patient tel [phone]      [facility] [id number] - [place], [place] - [address] [facility] [phone] - F [phone]

## 2025-03-31 RX ORDER — ROSUVASTATIN CALCIUM 20 MG/1
20 TABLET, COATED ORAL DAILY
COMMUNITY
Start: 2025-03-09

## 2025-03-31 RX ORDER — LIRAGLUTIDE 6 MG/ML
1.2 INJECTION, SOLUTION SUBCUTANEOUS
COMMUNITY
Start: 2025-01-15

## 2025-03-31 RX ORDER — ATORVASTATIN CALCIUM 10 MG/1
10 TABLET, FILM COATED ORAL DAILY
COMMUNITY
Start: 2025-01-08

## 2025-03-31 RX ORDER — AMLODIPINE BESYLATE 5 MG/1
5 TABLET ORAL DAILY
COMMUNITY
Start: 2025-02-01

## 2025-04-03 NOTE — PROGRESS NOTES
[facility]   Sleep Medicine     Office Progress Note - Initial Evaluation        [address]  [address]  [facility] [address] [phone] Fax    Reason for visit/referral: Evaluation for possible obstructive sleep apnea/sleep disordered breathing  Assessment:      1. JEANNE (obstructive sleep apnea)  -     PAT - Home Sleep Test; Future  2. Snoring  3. Nocturia  4. Essential hypertension  Assessment & Plan:   Chronic, not at goal (unstable), continue current treatment plan, needs to take BP meds when she gets home. Asymptomatic. Managed by PCP  5. Morbid obesity with BMI of 40.0-44.9, adult  Assessment & Plan:   Is trying to lose weight with medications, managed by PCP  6. Bruxism, sleep-related       Esme Gonzales is a 49-year-old female with history of hypertension and obesity who was referred for a reevaluation for obstructive sleep apnea.    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  We do not have access to those results.  At first she was told she did not have obstructive sleep apnea and then was called back and told that she did.  Is unclear what those results did actually demonstrate.  She was given a CPAP machine which she has used faithfully ever since even though it actually disrupts her sleep and she does not care for her feel if she gets benefit from it.    Her machine stopped working and/or if she ran out of supplies this past February.  In looking at her machine today, it looks fairly worn and the filter is being held in by tape.    It also seems that no one was monitoring her data in terms of a sleep professional and it is unclear if her machine was set correctly or her mask fit well etc.    We discussed options and I think getting an updated home sleep apnea test is necessary and would be needed anyway to establish her with a local DME company.  I suggest we get her updated home study and then

## 2025-04-03 NOTE — PATIENT INSTRUCTIONS
Please make a follow up appointment to discuss the results of your sleep study or I will send you a \"my chart\" message with your results and treatment options.     The [facility] is located in the [facility], adjacent to [facility]. The lab is on the second floor. The direct number to call for sleep study related questions is: [phone].    Please call our clinic back at [phone] or send a message on [employer] if you have any questions or concerns or if you are experiencing any of the following:     You have not received a follow up appointment within 30 days prior the recommended follow up time.    If you are not tolerating treatment plan and/or not able to obtain equipment or prescribed medication(s).  if you are experiencing any difficulties with the Durable Medical Equipment  (DME) Company you may be using or is assigned to you.  Two weeks have passed and you have not received an appointment for a scheduled procedure.  Two weeks have passed since you underwent a test and/or procedure and you have not received your results.  Your  Durable Medical Equipment (DME ) company is supposed to provide you with replacement filters, tubing and masks. You can either call your DME when you need new supplies or you can arrange for an automatic shipment schedule.    Your need to be seen by our office at lat minimum of every 12 months in order to renew the prescription for these supplies.   Please make note of who your DME company is and their phone number.   Please make sure that you clean your mask and hosing on a regular basis.  Your DME can provide you with additional information regarding proper care and cleaning of your device     Safety is strongly encouraged.  You should not drive if sleepy, tired, distracted and/or fatigued.      Chest Xrays and blood work do not require appointments.  They are considered \"Walk-In\" services and can be obtained at either [facility] or [facility]

## 2025-04-04 ENCOUNTER — OFFICE VISIT (OUTPATIENT)
Age: 50
End: 2025-04-04
Payer: MEDICAID

## 2025-04-04 VITALS
HEIGHT: 64 IN | HEART RATE: 113 BPM | WEIGHT: 247.4 LBS | DIASTOLIC BLOOD PRESSURE: 104 MMHG | OXYGEN SATURATION: 95 % | RESPIRATION RATE: 18 BRPM | BODY MASS INDEX: 42.24 KG/M2 | TEMPERATURE: 97.8 F | SYSTOLIC BLOOD PRESSURE: 172 MMHG

## 2025-04-04 DIAGNOSIS — E66.01 MORBID OBESITY WITH BMI OF 40.0-44.9, ADULT: ICD-10-CM

## 2025-04-04 DIAGNOSIS — G47.63 BRUXISM, SLEEP-RELATED: ICD-10-CM

## 2025-04-04 DIAGNOSIS — G47.33 OSA (OBSTRUCTIVE SLEEP APNEA): Primary | ICD-10-CM

## 2025-04-04 DIAGNOSIS — R35.1 NOCTURIA: ICD-10-CM

## 2025-04-04 DIAGNOSIS — I10 ESSENTIAL HYPERTENSION: ICD-10-CM

## 2025-04-04 DIAGNOSIS — R06.83 SNORING: ICD-10-CM

## 2025-04-04 PROBLEM — G47.19 EXCESSIVE DAYTIME SLEEPINESS: Status: ACTIVE | Noted: 2025-04-04

## 2025-04-04 PROCEDURE — 99204 OFFICE O/P NEW MOD 45 MIN: CPT | Performed by: OTOLARYNGOLOGY

## 2025-04-04 PROCEDURE — 3077F SYST BP >= 140 MM HG: CPT | Performed by: OTOLARYNGOLOGY

## 2025-04-04 PROCEDURE — 3080F DIAST BP >= 90 MM HG: CPT | Performed by: OTOLARYNGOLOGY

## 2025-04-04 ASSESSMENT — SLEEP AND FATIGUE QUESTIONNAIRES
HOW LIKELY ARE YOU TO NOD OFF OR FALL ASLEEP WHILE WATCHING TV: WOULD NEVER DOZE
HOW LIKELY ARE YOU TO NOD OFF OR FALL ASLEEP WHILE SITTING QUIETLY AFTER LUNCH WITHOUT ALCOHOL: WOULD NEVER DOZE
HOW LIKELY ARE YOU TO NOD OFF OR FALL ASLEEP WHILE SITTING AND READING: WOULD NEVER DOZE
HOW LIKELY ARE YOU TO NOD OFF OR FALL ASLEEP WHILE WATCHING TV: WOULD NEVER DOZE
HOW LIKELY ARE YOU TO NOD OFF OR FALL ASLEEP WHILE SITTING AND TALKING TO SOMEONE: WOULD NEVER DOZE
ESS TOTAL SCORE: 0
HOW LIKELY ARE YOU TO NOD OFF OR FALL ASLEEP WHILE LYING DOWN TO REST IN THE AFTERNOON WHEN CIRCUMSTANCES PERMIT: WOULD NEVER DOZE
HOW LIKELY ARE YOU TO NOD OFF OR FALL ASLEEP IN A CAR, WHILE STOPPED FOR A FEW MINUTES IN TRAFFIC: WOULD NEVER DOZE
HOW LIKELY ARE YOU TO NOD OFF OR FALL ASLEEP WHILE SITTING INACTIVE IN A PUBLIC PLACE: WOULD NEVER DOZE
HOW LIKELY ARE YOU TO NOD OFF OR FALL ASLEEP WHEN YOU ARE A PASSENGER IN A CAR FOR AN HOUR WITHOUT A BREAK: WOULD NEVER DOZE
HOW LIKELY ARE YOU TO NOD OFF OR FALL ASLEEP WHILE SITTING QUIETLY AFTER LUNCH WITHOUT ALCOHOL: WOULD NEVER DOZE
HOW LIKELY ARE YOU TO NOD OFF OR FALL ASLEEP WHILE SITTING AND TALKING TO SOMEONE: WOULD NEVER DOZE
HOW LIKELY ARE YOU TO NOD OFF OR FALL ASLEEP IN A CAR, WHILE STOPPED FOR A FEW MINUTES IN TRAFFIC: WOULD NEVER DOZE
HOW LIKELY ARE YOU TO NOD OFF OR FALL ASLEEP WHILE SITTING INACTIVE IN A PUBLIC PLACE: WOULD NEVER DOZE
HOW LIKELY ARE YOU TO NOD OFF OR FALL ASLEEP WHILE LYING DOWN TO REST IN THE AFTERNOON WHEN CIRCUMSTANCES PERMIT: WOULD NEVER DOZE
HOW LIKELY ARE YOU TO NOD OFF OR FALL ASLEEP WHILE SITTING AND READING: WOULD NEVER DOZE
HOW LIKELY ARE YOU TO NOD OFF OR FALL ASLEEP WHEN YOU ARE A PASSENGER IN A CAR FOR AN HOUR WITHOUT A BREAK: WOULD NEVER DOZE

## 2025-04-04 NOTE — PROGRESS NOTES
Esme Gonzales presents today for   Chief Complaint   Patient presents with    Sleep Apnea       Is someone accompanying this pt? no    Is the patient using any DME equipment during OV? cpap    -DME Company unknown    Depression Screening:         No data to display                Learning Assessment:    Failed to redirect to the Timeline version of the [employer].    Abuse Screening:         No data to display                Fall Risk    Failed to redirect to the Timeline version of the [employer] SmartLink.    Coordination of Care:    1. Have you been to the ER, urgent care clinic since your last visit? Hospitalized since your last visit? no    2. Have you seen or consulted any other health care providers outside of the [facility] since your last visit? Include any pap smears or colon screening. no    Medication list has been update per patient.

## 2025-04-04 NOTE — ASSESSMENT & PLAN NOTE
Chronic, not at goal (unstable), continue current treatment plan, needs to take BP meds when she gets home. Asymptomatic. Managed by PCP

## 2025-05-08 ENCOUNTER — OFFICE VISIT (OUTPATIENT)
Facility: CLINIC | Age: 50
End: 2025-05-08
Payer: MEDICAID

## 2025-05-08 VITALS
DIASTOLIC BLOOD PRESSURE: 95 MMHG | WEIGHT: 252 LBS | HEART RATE: 94 BPM | RESPIRATION RATE: 11 BRPM | SYSTOLIC BLOOD PRESSURE: 151 MMHG | OXYGEN SATURATION: 98 % | HEIGHT: 64 IN | BODY MASS INDEX: 43.02 KG/M2 | TEMPERATURE: 97.3 F

## 2025-05-08 DIAGNOSIS — Z12.4 CERVICAL CANCER SCREENING: ICD-10-CM

## 2025-05-08 DIAGNOSIS — Z76.89 ENCOUNTER TO ESTABLISH CARE WITH NEW DOCTOR: Primary | ICD-10-CM

## 2025-05-08 DIAGNOSIS — Z13.6 ENCOUNTER FOR SCREENING FOR CORONARY ARTERY DISEASE: ICD-10-CM

## 2025-05-08 DIAGNOSIS — D21.9 FIBROIDS: ICD-10-CM

## 2025-05-08 DIAGNOSIS — Z13.31 DEPRESSION SCREEN: ICD-10-CM

## 2025-05-08 DIAGNOSIS — R42 VERTIGO: ICD-10-CM

## 2025-05-08 DIAGNOSIS — Z12.31 SCREENING MAMMOGRAM FOR BREAST CANCER: ICD-10-CM

## 2025-05-08 DIAGNOSIS — Z13.220 SCREENING CHOLESTEROL LEVEL: ICD-10-CM

## 2025-05-08 DIAGNOSIS — E66.01 MORBID OBESITY WITH BMI OF 40.0-44.9, ADULT (HCC): ICD-10-CM

## 2025-05-08 DIAGNOSIS — Z78.0 MENOPAUSE: ICD-10-CM

## 2025-05-08 DIAGNOSIS — Z13.1 ENCOUNTER FOR SCREENING FOR DIABETES MELLITUS: ICD-10-CM

## 2025-05-08 DIAGNOSIS — I10 ESSENTIAL HYPERTENSION: ICD-10-CM

## 2025-05-08 DIAGNOSIS — M17.10 ARTHRITIS OF KNEE: ICD-10-CM

## 2025-05-08 PROCEDURE — G2211 COMPLEX E/M VISIT ADD ON: HCPCS | Performed by: STUDENT IN AN ORGANIZED HEALTH CARE EDUCATION/TRAINING PROGRAM

## 2025-05-08 PROCEDURE — 99203 OFFICE O/P NEW LOW 30 MIN: CPT | Performed by: STUDENT IN AN ORGANIZED HEALTH CARE EDUCATION/TRAINING PROGRAM

## 2025-05-08 PROCEDURE — 3077F SYST BP >= 140 MM HG: CPT | Performed by: STUDENT IN AN ORGANIZED HEALTH CARE EDUCATION/TRAINING PROGRAM

## 2025-05-08 PROCEDURE — 3080F DIAST BP >= 90 MM HG: CPT | Performed by: STUDENT IN AN ORGANIZED HEALTH CARE EDUCATION/TRAINING PROGRAM

## 2025-05-08 PROCEDURE — 96127 BRIEF EMOTIONAL/BEHAV ASSMT: CPT | Performed by: STUDENT IN AN ORGANIZED HEALTH CARE EDUCATION/TRAINING PROGRAM

## 2025-05-08 SDOH — ECONOMIC STABILITY: FOOD INSECURITY: WITHIN THE PAST 12 MONTHS, YOU WORRIED THAT YOUR FOOD WOULD RUN OUT BEFORE YOU GOT MONEY TO BUY MORE.: NEVER TRUE

## 2025-05-08 SDOH — ECONOMIC STABILITY: FOOD INSECURITY: WITHIN THE PAST 12 MONTHS, THE FOOD YOU BOUGHT JUST DIDN'T LAST AND YOU DIDN'T HAVE MONEY TO GET MORE.: NEVER TRUE

## 2025-05-08 SDOH — HEALTH STABILITY: PHYSICAL HEALTH: ON AVERAGE, HOW MANY DAYS PER WEEK DO YOU ENGAGE IN MODERATE TO STRENUOUS EXERCISE (LIKE A BRISK WALK)?: 1 DAY

## 2025-05-08 SDOH — HEALTH STABILITY: PHYSICAL HEALTH: ON AVERAGE, HOW MANY MINUTES DO YOU ENGAGE IN EXERCISE AT THIS LEVEL?: 10 MIN

## 2025-05-08 ASSESSMENT — PATIENT HEALTH QUESTIONNAIRE - PHQ9
SUM OF ALL RESPONSES TO PHQ QUESTIONS 1-9: 0
SUM OF ALL RESPONSES TO PHQ QUESTIONS 1-9: 0
2. FEELING DOWN, DEPRESSED OR HOPELESS: NOT AT ALL
SUM OF ALL RESPONSES TO PHQ QUESTIONS 1-9: 0
SUM OF ALL RESPONSES TO PHQ QUESTIONS 1-9: 0
1. LITTLE INTEREST OR PLEASURE IN DOING THINGS: NOT AT ALL

## 2025-05-08 NOTE — PATIENT INSTRUCTIONS
Please ask [facility] to send us your colon cancer screening lab results.  [address]  [address]  [place] [address]  P: [phone]  F: [phone]      It was nice meeting you today.  Please have your labs done either immediately after this visit, or you can schedule at the  to come back for labs.  Please do your labs at least a week before your next appointment.    If you have questions or concerns, My Chart is the fastest way to get in touch with me and the clinical staff.    Keep in mind that [facility] policy schedules most appointments to last 15 minutes. If you have a new problem or multiple problems you can request 30 minutes.  If you arrive more than [facility] through your scheduled appointment, staff may offer to reschedule you. This is to ensure you and the provider have enough time to complete a high quality encounter.  Please arrive to your appointments at least 10 minutes early to avoid any unforseen delays.     If you have trouble paying medical bills, please consider contacting [employer].  [employer] patient representatives are available to discuss government programs that provide assistance with medical bills to qualifying individuals and their families.  The services are provided free of charge to patients in need of assistance.  [employer] patient representatives can also assist you in completing and registering applications with appropriate agencies.  Please call the following number if you are interested: [phone].    Dear Esme Gonzales,      I hope this message finds you well. We wanted to inform you that our practice has made an attempt to reach out to you regarding your referral for your left knee pain. Unfortunately, it seems we were unable to connect.     Please feel free to contact us at your earliest convenience, and we will be happy to assist you with scheduling your appointment.     Thank you for your attention, and we look forward to hearing from you soon.

## 2025-05-08 NOTE — PROGRESS NOTES
Esme Gonzales is a 50 y.o. year old female who presents today for   Chief Complaint   Patient presents with    New Patient        \"Have you been to the ER, urgent care clinic since your last visit?  Hospitalized since your last visit?\"   NO      “Have you seen or consulted any other health care providers outside our system since your last visit?”   NO     Have you had a mammogram?”   NO    No breast cancer screening on file       “Have you had a colorectal cancer screening such as a colonoscopy/FIT/Cologuard?    NO    No colonoscopy on file  No cologuard on file  No FIT/FOBT on file   No flexible sigmoidoscopy on file           James Brown [facility]  [facility]  Ph: [phone]  Fax: [phone]

## 2025-05-08 NOTE — PROGRESS NOTES
History of Present Illness  Esme Gonzales is a 50 y.o. female who presents today for management of    Chief Complaint   Patient presents with    New Patient     CC: new patient here for healthcare maintenance    -Patient is here to establish care.   -Previous PCP: [facility]   -Employment: , but cannot pass DOT physical right now  -She rents a room     Medical History:  Fibroids  - was referred to gyn by previous PCP; no phone call yet    Menopause  - very severe    OA  - back and bilateral knees  - worse with sitting for too long  - ibuprofen 800 BID not very helpful  - was seeing [facility], but wants to see a different ortho clinic due to cancelled appointments    HLD  - crestor 20    HTN  - losartan-hctz 50-12.5  - stopped taking amlodipine due to SE (palpitations)    Dizziness  - one episode while laying down and turning    Social  - EtOH: rare  - Tobacco: no  - Illicit drugs: no    Healthcare maintenance, patient reported:  Vaccine records requested from previous PCP/pharmacy if available  Last Colonoscopy: FIT done last week with [facility]   Last Pap: unknown  Last Mammo: needed  Birth control: no       Social Drivers of Health     Tobacco Use: Low Risk  (5/8/2025)    Patient History     Smoking Tobacco Use: Never     Smokeless Tobacco Use: Never     Passive Exposure: Not on file   Alcohol Use: Not on file   Financial Resource Strain: Not on file   Food Insecurity: No Food Insecurity (5/8/2025)    Hunger Vital Sign     Worried About Running Out of Food in the Last Year: Never true     Ran Out of Food in the Last Year: Never true   Transportation Needs: No Transportation Needs (5/8/2025)    PRAPARE - Transportation     Lack of Transportation (Medical): No     Lack of Transportation (Non-Medical): No   Physical Activity: Insufficiently Active (5/8/2025)    Exercise Vital Sign     Days of Exercise per Week: 1 day     Minutes of Exercise per Session: 10 min

## 2025-05-09 ENCOUNTER — HOSPITAL ENCOUNTER (OUTPATIENT)
Facility: HOSPITAL | Age: 50
Setting detail: SPECIMEN
Discharge: HOME | End: 2025-05-12
Payer: MEDICAID

## 2025-05-09 DIAGNOSIS — E66.01 MORBID OBESITY WITH BMI OF 40.0-44.9, ADULT (HCC): ICD-10-CM

## 2025-05-09 DIAGNOSIS — Z13.220 SCREENING CHOLESTEROL LEVEL: ICD-10-CM

## 2025-05-09 DIAGNOSIS — Z13.1 ENCOUNTER FOR SCREENING FOR DIABETES MELLITUS: ICD-10-CM

## 2025-05-09 DIAGNOSIS — Z13.6 ENCOUNTER FOR SCREENING FOR CORONARY ARTERY DISEASE: ICD-10-CM

## 2025-05-09 DIAGNOSIS — I10 ESSENTIAL HYPERTENSION: ICD-10-CM

## 2025-05-09 DIAGNOSIS — Z76.89 ENCOUNTER TO ESTABLISH CARE WITH NEW DOCTOR: ICD-10-CM

## 2025-05-09 LAB
ALBUMIN SERPL-MCNC: 3.8 G/DL (ref 3.4–5)
ALBUMIN/GLOB SERPL: 1 (ref 0.8–1.7)
ALP SERPL-CCNC: 97 U/L (ref 45–117)
ALT SERPL-CCNC: 19 U/L (ref 10–35)
ANION GAP SERPL CALC-SCNC: 12 MMOL/L (ref 3–18)
AST SERPL-CCNC: 25 U/L (ref 10–38)
BILIRUB SERPL-MCNC: <0.2 MG/DL (ref 0.2–1)
BUN SERPL-MCNC: 12 MG/DL (ref 6–23)
BUN/CREAT SERPL: 17 (ref 12–20)
CALCIUM SERPL-MCNC: 9.7 MG/DL (ref 8.5–10.1)
CHLORIDE SERPL-SCNC: 101 MMOL/L (ref 98–107)
CHOLEST SERPL-MCNC: 206 MG/DL
CO2 SERPL-SCNC: 26 MMOL/L (ref 21–32)
CREAT SERPL-MCNC: 0.69 MG/DL (ref 0.6–1.3)
ERYTHROCYTE [DISTWIDTH] IN BLOOD BY AUTOMATED COUNT: 12.6 % (ref 11.6–14.5)
EST. AVERAGE GLUCOSE BLD GHB EST-MCNC: 137 MG/DL
GLOBULIN SER CALC-MCNC: 3.7 G/DL (ref 2–4)
GLUCOSE SERPL-MCNC: 87 MG/DL (ref 74–108)
HBA1C MFR BLD: 6.4 % (ref 4.2–5.6)
HCT VFR BLD AUTO: 41.8 % (ref 35–45)
HDLC SERPL-MCNC: 53 MG/DL (ref 40–60)
HDLC SERPL: 3.9 (ref 0–5)
HGB BLD-MCNC: 13 G/DL (ref 12–16)
LDLC SERPL CALC-MCNC: 144 MG/DL (ref 0–100)
MCH RBC QN AUTO: 27.8 PG (ref 24–34)
MCHC RBC AUTO-ENTMCNC: 31.1 G/DL (ref 31–37)
MCV RBC AUTO: 89.5 FL (ref 78–100)
NRBC # BLD: 0 K/UL (ref 0–0.01)
NRBC BLD-RTO: 0 PER 100 WBC
PLATELET # BLD AUTO: 392 K/UL (ref 135–420)
PMV BLD AUTO: 10.2 FL (ref 9.2–11.8)
POTASSIUM SERPL-SCNC: 4 MMOL/L (ref 3.5–5.5)
PROT SERPL-MCNC: 7.5 G/DL (ref 6.4–8.2)
RBC # BLD AUTO: 4.67 M/UL (ref 4.2–5.3)
SODIUM SERPL-SCNC: 139 MMOL/L (ref 136–145)
TRIGL SERPL-MCNC: 48 MG/DL (ref 0–150)
VLDLC SERPL CALC-MCNC: 10 MG/DL
WBC # BLD AUTO: 4.4 K/UL (ref 4.6–13.2)

## 2025-05-09 PROCEDURE — 80053 COMPREHEN METABOLIC PANEL: CPT

## 2025-05-09 PROCEDURE — 36415 COLL VENOUS BLD VENIPUNCTURE: CPT

## 2025-05-09 PROCEDURE — 85027 COMPLETE CBC AUTOMATED: CPT

## 2025-05-09 PROCEDURE — 83036 HEMOGLOBIN GLYCOSYLATED A1C: CPT

## 2025-05-09 PROCEDURE — 80061 LIPID PANEL: CPT

## 2025-05-19 ENCOUNTER — HOSPITAL ENCOUNTER (OUTPATIENT)
Dept: SLEEP MEDICINE | Facility: HOSPITAL | Age: 50
Discharge: HOME | End: 2025-05-22
Attending: OTOLARYNGOLOGY
Payer: MEDICAID

## 2025-05-19 DIAGNOSIS — G47.33 OSA (OBSTRUCTIVE SLEEP APNEA): ICD-10-CM

## 2025-05-19 PROCEDURE — 95800 SLP STDY UNATTENDED: CPT

## 2025-05-20 PROBLEM — G47.33 OSA (OBSTRUCTIVE SLEEP APNEA): Status: ACTIVE | Noted: 2025-05-20

## 2025-05-22 ENCOUNTER — OFFICE VISIT (OUTPATIENT)
Facility: CLINIC | Age: 50
End: 2025-05-22
Payer: MEDICAID

## 2025-05-22 VITALS
DIASTOLIC BLOOD PRESSURE: 82 MMHG | SYSTOLIC BLOOD PRESSURE: 139 MMHG | OXYGEN SATURATION: 97 % | WEIGHT: 253.2 LBS | HEART RATE: 98 BPM | RESPIRATION RATE: 13 BRPM | HEIGHT: 64 IN | BODY MASS INDEX: 43.23 KG/M2 | TEMPERATURE: 97.2 F

## 2025-05-22 DIAGNOSIS — I10 ESSENTIAL HYPERTENSION: ICD-10-CM

## 2025-05-22 DIAGNOSIS — E11.9 TYPE 2 DIABETES MELLITUS WITHOUT COMPLICATION, WITHOUT LONG-TERM CURRENT USE OF INSULIN (HCC): Primary | ICD-10-CM

## 2025-05-22 DIAGNOSIS — E78.00 HYPERCHOLESTEREMIA: ICD-10-CM

## 2025-05-22 PROCEDURE — 99214 OFFICE O/P EST MOD 30 MIN: CPT | Performed by: STUDENT IN AN ORGANIZED HEALTH CARE EDUCATION/TRAINING PROGRAM

## 2025-05-22 PROCEDURE — G2211 COMPLEX E/M VISIT ADD ON: HCPCS | Performed by: STUDENT IN AN ORGANIZED HEALTH CARE EDUCATION/TRAINING PROGRAM

## 2025-05-22 PROCEDURE — 3044F HG A1C LEVEL LT 7.0%: CPT | Performed by: STUDENT IN AN ORGANIZED HEALTH CARE EDUCATION/TRAINING PROGRAM

## 2025-05-22 PROCEDURE — 3075F SYST BP GE 130 - 139MM HG: CPT | Performed by: STUDENT IN AN ORGANIZED HEALTH CARE EDUCATION/TRAINING PROGRAM

## 2025-05-22 PROCEDURE — 3079F DIAST BP 80-89 MM HG: CPT | Performed by: STUDENT IN AN ORGANIZED HEALTH CARE EDUCATION/TRAINING PROGRAM

## 2025-05-22 RX ORDER — CANDESARTAN CILEXETIL AND HYDROCHLOROTHIAZIDE 16; 12.5 MG/1; MG/1
2 TABLET ORAL DAILY
Qty: 60 TABLET | Refills: 0 | Status: SHIPPED | OUTPATIENT
Start: 2025-05-22

## 2025-05-22 NOTE — PROGRESS NOTES
Esme Gonzales (:  1975) is a 50 y.o. female here for evaluation of the following chief complaint(s):  Discuss Labs, Hypertension, and Obesity        ASSESSMENT/PLAN:  1. Type 2 diabetes mellitus without complication, without long-term current use of insulin (HCC)  Assessment & Plan:  - A1C 6.4  - starts metformin  - A1C in 3 months   Orders:  -     metFORMIN (GLUCOPHAGE) 500 MG tablet; Take 1 tablet by mouth daily (with breakfast), Disp-90 tablet, R-1Normal  -     Hemoglobin A1C; Future  2. Essential hypertension  Assessment & Plan:  - controlled  - poorly tolerated losatan, switch to candesartan-hctz  - check BP in 1 month   Orders:  -     candesartan-hydroCHLOROthiazide (ATACAND HCT) 16-12.5 MG per tablet; Take 2 tablets by mouth daily, Disp-60 tablet, R-0Normal  3. Hypercholesteremia  Assessment & Plan:  -   - pt wants to cont rosuvastatin 20  - chek lipids in 6 months    Orders:  -     Lipid Panel; Future      Follow-up and Dispositions    Return in about 3 months (around 2025) for Diabetes.         SUBJECTIVE/OBJECTIVE:  Hypertension      T2DM  - A1C 6.4  - accepts metformin    Fibroids  - was referred to gyn by previous PCP; no phone call yet  Update (25)  - no call from gyn; pt to call     Menopause  - very severe  Update (25)  - could not get fezolinetant, it was denied by insurance; she is not sure why     OA  - back and bilateral knees  - worse with sitting for too long  - ibuprofen 800 BID not very helpful  - was seeing [facility], but wants to see a different ortho clinic due to cancelled appointments  Update (25)  - no call from ortho; pt to call     HLD  - crestor 20  Update (25)  -       HTN  - losartan-hctz 50-12.5  - stopped taking amlodipine due to SE (palpitations)  Update (25)  - pt corrected the dose to 100-25; it is causing drowsiness     Dizziness  - one episode while laying down and turning  Update (25)  - epley maneuvers

## 2025-05-22 NOTE — PROGRESS NOTES
Esme Gonzales is a 50 y.o. year old female who presents today for   Chief Complaint   Patient presents with    Discuss Labs    Hypertension    Obesity        \"Have you been to the ER, urgent care clinic since your last visit?  Hospitalized since your last visit?\"   NO      “Have you seen or consulted any other health care providers outside our system since your last visit?”   NO       “Have you had a colorectal cancer screening such as a colonoscopy/FIT/Cologuard?    NO    No colonoscopy on file  No cologuard on file  No FIT/FOBT on file   No flexible sigmoidoscopy on file           James Brown CMA  [facility]  Ph: [phone]  Fax: [phone]

## 2025-05-22 NOTE — PROGRESS NOTES
Esme Gonzales (:  1975) is a 50 y.o. female here for evaluation of the following chief complaint(s):  Discuss Labs, Hypertension, and Obesity        ASSESSMENT/PLAN:  1. Type 2 diabetes mellitus without complication, without long-term current use of insulin (HCC)  -     metFORMIN (GLUCOPHAGE) 500 MG tablet; Take 1 tablet by mouth daily (with breakfast), Disp-90 tablet, R-1Normal  -     Hemoglobin A1C; Future  2. Essential hypertension  -     candesartan-hydroCHLOROthiazide (ATACAND HCT) 16-12.5 MG per tablet; Take 2 tablets by mouth daily, Disp-60 tablet, R-0Normal  3. Hypercholesteremia  Assessment & Plan:  -   - pt wants to cont rosuvastatin 20  - chek lipids in 6 months    Orders:  -     Lipid Panel; Future      Follow-up and Dispositions    Return in about 3 months (around 2025) for Diabetes.         SUBJECTIVE/OBJECTIVE:  HPI  T2DM  - A1C 6.4  - accepts metformin    Fibroids  - was referred to gyn by previous PCP; no phone call yet  Update (25)  - no call from gyn; pt to call     Menopause  - very severe  Update (25)  - could not get fezolinetant, it was denied by insurance; she is not sure why     OA  - back and bilateral knees  - worse with sitting for too long  - ibuprofen 800 BID not very helpful  - was seeing [facility], but wants to see a different ortho clinic due to cancelled appointments  Update (25)  - no call from ortho; pt to call     HLD  - crestor 20  Update (25)  -       HTN  - losartan-hctz 50-12.5  - stopped taking amlodipine due to SE (palpitations)  Update (25)  - pt corrected the dose to 100-25; it is causing drowsiness     Dizziness  - one episode while laying down and turning  Update (25)  - epley maneuvers helped    ROS as stated above.    /82 (BP Site: Left Upper Arm, Patient Position: Sitting, BP Cuff Size: Large Adult)   Pulse 98   Temp 97.2 °F (36.2 °C) (Temporal)   Resp 13   Ht 1.626 m (5' 4\")   Wt 114.9 kg

## 2025-05-23 ENCOUNTER — TELEPHONE (OUTPATIENT)
Age: 50
End: 2025-05-23

## 2025-05-23 DIAGNOSIS — G47.33 OSA (OBSTRUCTIVE SLEEP APNEA): Primary | ICD-10-CM

## 2025-05-23 NOTE — TELEPHONE ENCOUNTER
Pt would like to do a virtual appt to discuss the option of the oral appliance. She really don't want to do the cpap machine.

## 2025-05-27 ENCOUNTER — TELEPHONE (OUTPATIENT)
Facility: CLINIC | Age: 50
End: 2025-05-27

## 2025-05-27 NOTE — TELEPHONE ENCOUNTER
Prior authorization for Candesartan has been denied by insurance. The message received \"(1) You have failed one preferred drug (as confirmed by claims history or submission of medical records). The preferred drugs: irbesartan/hydrochlorothiazide, olmesartan/hydrochlorothiazide, valsartan/hydrochlorothiazide\" patient may schedule a virtual with  to explore next steps.

## 2025-06-10 NOTE — TELEPHONE ENCOUNTER
Spoke to pt,  pt has decided she would like to try CPAP.   Order, notes, and Hst faxed to Will at [phone]

## 2025-06-23 ENCOUNTER — CLINICAL DOCUMENTATION (OUTPATIENT)
Age: 50
End: 2025-06-23

## 2025-06-23 NOTE — PROGRESS NOTES
[facility] Company:   Order sent to [facility] 6-6-2025.

## 2025-07-07 ENCOUNTER — OFFICE VISIT (OUTPATIENT)
Facility: CLINIC | Age: 50
End: 2025-07-07
Payer: MEDICAID

## 2025-07-07 VITALS
TEMPERATURE: 97.1 F | HEIGHT: 64 IN | BODY MASS INDEX: 42.72 KG/M2 | RESPIRATION RATE: 20 BRPM | HEART RATE: 99 BPM | OXYGEN SATURATION: 98 % | WEIGHT: 250.2 LBS | SYSTOLIC BLOOD PRESSURE: 135 MMHG | DIASTOLIC BLOOD PRESSURE: 74 MMHG

## 2025-07-07 DIAGNOSIS — G89.29 CHRONIC BILATERAL LOW BACK PAIN WITHOUT SCIATICA: Primary | ICD-10-CM

## 2025-07-07 DIAGNOSIS — I10 ESSENTIAL HYPERTENSION: ICD-10-CM

## 2025-07-07 DIAGNOSIS — M54.50 CHRONIC BILATERAL LOW BACK PAIN WITHOUT SCIATICA: Primary | ICD-10-CM

## 2025-07-07 DIAGNOSIS — E11.9 TYPE 2 DIABETES MELLITUS WITHOUT COMPLICATION, WITHOUT LONG-TERM CURRENT USE OF INSULIN (HCC): ICD-10-CM

## 2025-07-07 DIAGNOSIS — Z78.0 MENOPAUSE: ICD-10-CM

## 2025-07-07 DIAGNOSIS — M17.10 ARTHRITIS OF KNEE: ICD-10-CM

## 2025-07-07 PROCEDURE — 3075F SYST BP GE 130 - 139MM HG: CPT | Performed by: STUDENT IN AN ORGANIZED HEALTH CARE EDUCATION/TRAINING PROGRAM

## 2025-07-07 PROCEDURE — 3078F DIAST BP <80 MM HG: CPT | Performed by: STUDENT IN AN ORGANIZED HEALTH CARE EDUCATION/TRAINING PROGRAM

## 2025-07-07 PROCEDURE — 3044F HG A1C LEVEL LT 7.0%: CPT | Performed by: STUDENT IN AN ORGANIZED HEALTH CARE EDUCATION/TRAINING PROGRAM

## 2025-07-07 PROCEDURE — G2211 COMPLEX E/M VISIT ADD ON: HCPCS | Performed by: STUDENT IN AN ORGANIZED HEALTH CARE EDUCATION/TRAINING PROGRAM

## 2025-07-07 PROCEDURE — 99215 OFFICE O/P EST HI 40 MIN: CPT | Performed by: STUDENT IN AN ORGANIZED HEALTH CARE EDUCATION/TRAINING PROGRAM

## 2025-07-07 RX ORDER — CANDESARTAN CILEXETIL AND HYDROCHLOROTHIAZIDE 16; 12.5 MG/1; MG/1
2 TABLET ORAL DAILY
Qty: 60 TABLET | Refills: 0 | Status: SHIPPED | OUTPATIENT
Start: 2025-07-07

## 2025-07-07 SDOH — ECONOMIC STABILITY: FOOD INSECURITY: WITHIN THE PAST 12 MONTHS, YOU WORRIED THAT YOUR FOOD WOULD RUN OUT BEFORE YOU GOT MONEY TO BUY MORE.: NEVER TRUE

## 2025-07-07 SDOH — ECONOMIC STABILITY: FOOD INSECURITY: WITHIN THE PAST 12 MONTHS, THE FOOD YOU BOUGHT JUST DIDN'T LAST AND YOU DIDN'T HAVE MONEY TO GET MORE.: NEVER TRUE

## 2025-07-07 ASSESSMENT — PATIENT HEALTH QUESTIONNAIRE - PHQ9
2. FEELING DOWN, DEPRESSED OR HOPELESS: NOT AT ALL
SUM OF ALL RESPONSES TO PHQ QUESTIONS 1-9: 0
1. LITTLE INTEREST OR PLEASURE IN DOING THINGS: NOT AT ALL
SUM OF ALL RESPONSES TO PHQ QUESTIONS 1-9: 0

## 2025-07-07 NOTE — PROGRESS NOTES
Esme Gonzales is a 50 y.o. year old female who presents today for   Chief Complaint   Patient presents with    Medication Check    Leg Pain     RIGHT CALF    DMV PLACARD    HOT FLASHES        \"Have you been to the ER, urgent care clinic since your last visit?  Hospitalized since your last visit?\"   NO     “Have you seen or consulted any other health care providers outside our system since your last visit?”   NO     Have you had a mammogram?”   NO    No breast cancer screening on file       “Have you had a colorectal cancer screening such as a colonoscopy/FIT/Cologuard?    NO    No colonoscopy on file  No cologuard on file  No FIT/FOBT on file   No flexible sigmoidoscopy on file     “Have you had a diabetic eye exam?”    NO     No diabetic eye exam on file           Rosemary Villa  [facility]  Phone: [phone]  Fax: [phone]  
knees  - back and bilateral knees  - worse with sitting for too long  - ibuprofen 800 BID not very helpful  - was seeing [facility], but wants to see a different ortho clinic due to cancelled appointments  Update (07/07/25)  - no call from [facility] yet for knees  - interested in PT for knees    LBP  - worse with sitting for too long  Update (07/07/25)  - no interest in surgery; interested in PT and pain management  - trying to lose weight    ROS as stated above.    /74   Pulse 99   Temp 97.1 °F (36.2 °C) (Temporal)   Resp 20   Ht 1.626 m (5' 4\")   Wt 113.5 kg (250 lb 3.2 oz)   SpO2 98%   BMI 42.95 kg/m²     Physical Exam  Vitals and nursing note reviewed.   Pulmonary:      Effort: Pulmonary effort is normal. No respiratory distress.   Neurological:      Mental Status: She is alert and oriented to person, place, and time.   Psychiatric:         Mood and Affect: Mood normal.               An electronic signature was used to authenticate this note.    --Dell Olea MD

## 2025-07-07 NOTE — ASSESSMENT & PLAN NOTE
- uncontrolled  - no interest in surgery, so will try pain management for other possible procedures

## 2025-07-07 NOTE — ASSESSMENT & PLAN NOTE
- controlled  - but still taking losartan bec her insurance did not cover the medication change  - will reorder candesartan now that pt has different insurance

## 2025-07-07 NOTE — PATIENT INSTRUCTIONS
[facility]  Phone:[phone]    Please have your previous PCP send us your FIT result records.  [address] [place] [address]  P: [phone]  F: [phone]

## 2025-08-06 DIAGNOSIS — I10 ESSENTIAL HYPERTENSION: ICD-10-CM

## 2025-08-06 DIAGNOSIS — Z78.0 MENOPAUSE: ICD-10-CM

## 2025-08-07 ENCOUNTER — OFFICE VISIT (OUTPATIENT)
Facility: CLINIC | Age: 50
End: 2025-08-07
Payer: MEDICAID

## 2025-08-07 VITALS
HEIGHT: 64 IN | BODY MASS INDEX: 42.24 KG/M2 | RESPIRATION RATE: 15 BRPM | TEMPERATURE: 97.2 F | WEIGHT: 247.4 LBS | HEART RATE: 93 BPM | SYSTOLIC BLOOD PRESSURE: 160 MMHG | OXYGEN SATURATION: 98 % | DIASTOLIC BLOOD PRESSURE: 107 MMHG

## 2025-08-07 DIAGNOSIS — N93.9 VAGINAL SPOTTING: ICD-10-CM

## 2025-08-07 DIAGNOSIS — L70.8 OTHER ACNE: ICD-10-CM

## 2025-08-07 DIAGNOSIS — E11.9 TYPE 2 DIABETES MELLITUS WITHOUT COMPLICATION, WITHOUT LONG-TERM CURRENT USE OF INSULIN (HCC): Primary | ICD-10-CM

## 2025-08-07 LAB — HBA1C MFR BLD: 6 %

## 2025-08-07 PROCEDURE — 3080F DIAST BP >= 90 MM HG: CPT | Performed by: STUDENT IN AN ORGANIZED HEALTH CARE EDUCATION/TRAINING PROGRAM

## 2025-08-07 PROCEDURE — 99215 OFFICE O/P EST HI 40 MIN: CPT | Performed by: STUDENT IN AN ORGANIZED HEALTH CARE EDUCATION/TRAINING PROGRAM

## 2025-08-07 PROCEDURE — G2211 COMPLEX E/M VISIT ADD ON: HCPCS | Performed by: STUDENT IN AN ORGANIZED HEALTH CARE EDUCATION/TRAINING PROGRAM

## 2025-08-07 PROCEDURE — 3077F SYST BP >= 140 MM HG: CPT | Performed by: STUDENT IN AN ORGANIZED HEALTH CARE EDUCATION/TRAINING PROGRAM

## 2025-08-07 PROCEDURE — 83036 HEMOGLOBIN GLYCOSYLATED A1C: CPT | Performed by: STUDENT IN AN ORGANIZED HEALTH CARE EDUCATION/TRAINING PROGRAM

## 2025-08-07 PROCEDURE — 3044F HG A1C LEVEL LT 7.0%: CPT | Performed by: STUDENT IN AN ORGANIZED HEALTH CARE EDUCATION/TRAINING PROGRAM

## 2025-08-07 RX ORDER — CLINDAMYCIN PHOSPHATE 10 MG/G
1 GEL TOPICAL 2 TIMES DAILY PRN
Qty: 60 G | Refills: 1 | Status: SHIPPED | OUTPATIENT
Start: 2025-08-07

## 2025-08-07 SDOH — ECONOMIC STABILITY: FOOD INSECURITY: WITHIN THE PAST 12 MONTHS, THE FOOD YOU BOUGHT JUST DIDN'T LAST AND YOU DIDN'T HAVE MONEY TO GET MORE.: OFTEN TRUE

## 2025-08-07 SDOH — ECONOMIC STABILITY: FOOD INSECURITY: WITHIN THE PAST 12 MONTHS, YOU WORRIED THAT YOUR FOOD WOULD RUN OUT BEFORE YOU GOT MONEY TO BUY MORE.: OFTEN TRUE

## 2025-08-08 RX ORDER — CANDESARTAN CILEXETIL AND HYDROCHLOROTHIAZIDE 16; 12.5 MG/1; MG/1
2 TABLET ORAL DAILY
Qty: 60 TABLET | Refills: 0 | Status: SHIPPED | OUTPATIENT
Start: 2025-08-08

## 2025-08-08 RX ORDER — FEZOLINETANT 45 MG/1
1 TABLET, FILM COATED ORAL DAILY
Qty: 30 TABLET | Refills: 0 | Status: SHIPPED | OUTPATIENT
Start: 2025-08-08